# Patient Record
Sex: MALE | Race: WHITE | NOT HISPANIC OR LATINO | Employment: FULL TIME | URBAN - METROPOLITAN AREA
[De-identification: names, ages, dates, MRNs, and addresses within clinical notes are randomized per-mention and may not be internally consistent; named-entity substitution may affect disease eponyms.]

---

## 2017-03-10 ENCOUNTER — OFFICE VISIT (OUTPATIENT)
Dept: FAMILY MEDICINE CLINIC | Facility: CLINIC | Age: 57
End: 2017-03-10

## 2017-03-10 VITALS
SYSTOLIC BLOOD PRESSURE: 120 MMHG | RESPIRATION RATE: 16 BRPM | HEART RATE: 74 BPM | BODY MASS INDEX: 24.37 KG/M2 | HEIGHT: 72 IN | OXYGEN SATURATION: 99 % | TEMPERATURE: 98.6 F | DIASTOLIC BLOOD PRESSURE: 76 MMHG | WEIGHT: 179.9 LBS

## 2017-03-10 DIAGNOSIS — J30.1 SEASONAL ALLERGIC RHINITIS DUE TO POLLEN: Primary | ICD-10-CM

## 2017-03-10 DIAGNOSIS — H69.83 EUSTACHIAN TUBE DYSFUNCTION, BILATERAL: ICD-10-CM

## 2017-03-10 PROBLEM — H69.80 EUSTACHIAN TUBE DYSFUNCTION: Status: ACTIVE | Noted: 2017-03-10

## 2017-03-10 PROBLEM — H69.90 EUSTACHIAN TUBE DYSFUNCTION: Status: ACTIVE | Noted: 2017-03-10

## 2017-03-10 PROCEDURE — 99213 OFFICE O/P EST LOW 20 MIN: CPT | Performed by: INTERNAL MEDICINE

## 2017-03-10 RX ORDER — METHYLPREDNISOLONE 4 MG/1
TABLET ORAL
Qty: 21 TABLET | Refills: 0 | Status: SHIPPED | OUTPATIENT
Start: 2017-03-10 | End: 2018-10-31

## 2017-03-10 NOTE — PROGRESS NOTES
"Subjective   Patient ID: Arnaud Franklin is a 56 y.o. male presents with   Chief Complaint   Patient presents with   • Earache     more in the right ear- bird patient       HPI - this patient had a cold about 3 weeks ago he got over it but now his ears are getting stopped up and he's given some nasal congestion.  Now his ears are popping and stopped up he has no fever or purulent drainage.  He's tried some Flonase and Allegra but it hasn't worked.    Assessment plan    Seasonal allergic rhinitis and eustachian tube dysfunction Medrol Dosepak.    Allergies   Allergen Reactions   • Penicillins        The following portions of the patient's history were reviewed and updated as appropriate: allergies, current medications, past family history, past medical history, past social history, past surgical history and problem list.      Review of Systems   Constitutional: Negative for fatigue and fever.   HENT: Positive for congestion.         Ear popping, \"feels like he's in a barrel\"   Eyes: Negative.    Respiratory: Negative.        Objective     Vitals:    03/10/17 1442   BP: 120/76   Pulse: 74   Resp: 16   Temp: 98.6 °F (37 °C)   TempSrc: Oral   SpO2: 99%   Weight: 179 lb 14.4 oz (81.6 kg)   Height: 72\" (182.9 cm)         Physical Exam   Constitutional: He is oriented to person, place, and time. He appears well-developed and well-nourished.   HENT:   Head: Normocephalic and atraumatic.   Mouth/Throat: Oropharynx is clear and moist.   TMs suggestive of eustachian tube dysfunction.   Eyes: EOM are normal. Pupils are equal, round, and reactive to light.   Pulmonary/Chest: Effort normal and breath sounds normal.   Neurological: He is alert and oriented to person, place, and time.   Nursing note and vitals reviewed.        Arnaud was seen today for earache.    Diagnoses and all orders for this visit:    Seasonal allergic rhinitis due to pollen    Eustachian tube dysfunction, bilateral    Other orders  -     MethylPREDNISolone " (MEDPAMELLA JAMESON,) 4 MG tablet; Take as directed on package instructions.        Call or return to clinic prn if these symptoms worsen or fail to improve as anticipated.

## 2017-07-12 RX ORDER — PANTOPRAZOLE SODIUM 40 MG/1
TABLET, DELAYED RELEASE ORAL
Qty: 30 TABLET | Refills: 0 | Status: SHIPPED | OUTPATIENT
Start: 2017-07-12 | End: 2017-08-25 | Stop reason: SDUPTHER

## 2017-08-25 RX ORDER — PANTOPRAZOLE SODIUM 40 MG/1
40 TABLET, DELAYED RELEASE ORAL DAILY
Qty: 90 TABLET | Refills: 1 | Status: SHIPPED | OUTPATIENT
Start: 2017-08-25 | End: 2018-03-05 | Stop reason: SDUPTHER

## 2017-11-21 RX ORDER — AZELASTINE HCL 205.5 UG/1
SPRAY NASAL
Qty: 30 ML | Refills: 5 | Status: SHIPPED | OUTPATIENT
Start: 2017-11-21 | End: 2022-01-01

## 2018-03-05 RX ORDER — PANTOPRAZOLE SODIUM 40 MG/1
TABLET, DELAYED RELEASE ORAL
Qty: 90 TABLET | Refills: 0 | Status: SHIPPED | OUTPATIENT
Start: 2018-03-05 | End: 2018-06-11 | Stop reason: SDUPTHER

## 2018-06-11 RX ORDER — PANTOPRAZOLE SODIUM 40 MG/1
40 TABLET, DELAYED RELEASE ORAL DAILY
Qty: 90 TABLET | Refills: 0 | Status: SHIPPED | OUTPATIENT
Start: 2018-06-11 | End: 2018-09-27 | Stop reason: SDUPTHER

## 2018-09-27 RX ORDER — PANTOPRAZOLE SODIUM 40 MG/1
TABLET, DELAYED RELEASE ORAL
Qty: 90 TABLET | Refills: 0 | Status: SHIPPED | OUTPATIENT
Start: 2018-09-27 | End: 2018-12-07 | Stop reason: SDUPTHER

## 2018-10-23 DIAGNOSIS — Z00.00 HEALTH CARE MAINTENANCE: Primary | ICD-10-CM

## 2018-10-26 LAB
ALBUMIN SERPL-MCNC: 4.6 G/DL (ref 3.5–5.2)
ALBUMIN/GLOB SERPL: 1.8 G/DL
ALP SERPL-CCNC: 54 U/L (ref 39–117)
ALT SERPL-CCNC: 18 U/L (ref 1–41)
APPEARANCE UR: CLEAR
AST SERPL-CCNC: 20 U/L (ref 1–40)
BASOPHILS # BLD AUTO: 0.03 10*3/MM3 (ref 0–0.2)
BASOPHILS NFR BLD AUTO: 0.5 % (ref 0–1.5)
BILIRUB SERPL-MCNC: 0.6 MG/DL (ref 0.1–1.2)
BILIRUB UR QL STRIP: NEGATIVE
BUN SERPL-MCNC: 16 MG/DL (ref 6–20)
BUN/CREAT SERPL: 16 (ref 7–25)
CALCIUM SERPL-MCNC: 9.4 MG/DL (ref 8.6–10.5)
CHLORIDE SERPL-SCNC: 101 MMOL/L (ref 98–107)
CHOLEST SERPL-MCNC: 233 MG/DL (ref 0–200)
CO2 SERPL-SCNC: 26.6 MMOL/L (ref 22–29)
COLOR UR: YELLOW
CREAT SERPL-MCNC: 1 MG/DL (ref 0.76–1.27)
EOSINOPHIL # BLD AUTO: 0.36 10*3/MM3 (ref 0–0.7)
EOSINOPHIL NFR BLD AUTO: 5.7 % (ref 0.3–6.2)
ERYTHROCYTE [DISTWIDTH] IN BLOOD BY AUTOMATED COUNT: 12.9 % (ref 11.5–14.5)
GLOBULIN SER CALC-MCNC: 2.5 GM/DL
GLUCOSE SERPL-MCNC: 91 MG/DL (ref 65–99)
GLUCOSE UR QL: NEGATIVE
HCT VFR BLD AUTO: 46.2 % (ref 40.4–52.2)
HDLC SERPL-MCNC: 93 MG/DL (ref 40–60)
HGB BLD-MCNC: 15.6 G/DL (ref 13.7–17.6)
HGB UR QL STRIP: NEGATIVE
IMM GRANULOCYTES # BLD: 0.02 10*3/MM3 (ref 0–0.03)
IMM GRANULOCYTES NFR BLD: 0.3 % (ref 0–0.5)
KETONES UR QL STRIP: NEGATIVE
LDLC SERPL CALC-MCNC: 126 MG/DL (ref 0–100)
LEUKOCYTE ESTERASE UR QL STRIP: NEGATIVE
LYMPHOCYTES # BLD AUTO: 1.81 10*3/MM3 (ref 0.9–4.8)
LYMPHOCYTES NFR BLD AUTO: 28.8 % (ref 19.6–45.3)
MCH RBC QN AUTO: 32.1 PG (ref 27–32.7)
MCHC RBC AUTO-ENTMCNC: 33.8 G/DL (ref 32.6–36.4)
MCV RBC AUTO: 95.1 FL (ref 79.8–96.2)
MONOCYTES # BLD AUTO: 0.64 10*3/MM3 (ref 0.2–1.2)
MONOCYTES NFR BLD AUTO: 10.2 % (ref 5–12)
NEUTROPHILS # BLD AUTO: 3.45 10*3/MM3 (ref 1.9–8.1)
NEUTROPHILS NFR BLD AUTO: 54.8 % (ref 42.7–76)
NITRITE UR QL STRIP: NEGATIVE
PH UR STRIP: 6 [PH] (ref 5–8)
PLATELET # BLD AUTO: 224 10*3/MM3 (ref 140–500)
POTASSIUM SERPL-SCNC: 4.4 MMOL/L (ref 3.5–5.2)
PROT SERPL-MCNC: 7.1 G/DL (ref 6–8.5)
PROT UR QL STRIP: NEGATIVE
RBC # BLD AUTO: 4.86 10*6/MM3 (ref 4.6–6)
SODIUM SERPL-SCNC: 138 MMOL/L (ref 136–145)
SP GR UR: 1.02 (ref 1–1.03)
TRIGL SERPL-MCNC: 68 MG/DL (ref 0–150)
UROBILINOGEN UR STRIP-MCNC: NORMAL MG/DL
VLDLC SERPL CALC-MCNC: 13.6 MG/DL (ref 5–40)
WBC # BLD AUTO: 6.29 10*3/MM3 (ref 4.5–10.7)

## 2018-10-31 ENCOUNTER — OFFICE VISIT (OUTPATIENT)
Dept: FAMILY MEDICINE CLINIC | Facility: CLINIC | Age: 58
End: 2018-10-31

## 2018-10-31 VITALS
BODY MASS INDEX: 23.77 KG/M2 | DIASTOLIC BLOOD PRESSURE: 92 MMHG | SYSTOLIC BLOOD PRESSURE: 137 MMHG | HEIGHT: 72 IN | TEMPERATURE: 98.3 F | WEIGHT: 175.5 LBS | HEART RATE: 82 BPM | OXYGEN SATURATION: 97 %

## 2018-10-31 DIAGNOSIS — Z12.5 SCREENING PSA (PROSTATE SPECIFIC ANTIGEN): ICD-10-CM

## 2018-10-31 DIAGNOSIS — Z00.00 HEALTH CARE MAINTENANCE: Primary | ICD-10-CM

## 2018-10-31 PROCEDURE — 99396 PREV VISIT EST AGE 40-64: CPT | Performed by: FAMILY MEDICINE

## 2018-10-31 NOTE — PROGRESS NOTES
"Geoff Franklin is a 58 y.o. male.     Chief Complaint   Patient presents with   • Annual Exam     follow up labs        History of Present Illness      Annual wellness visit.    Exercises regularly but not as much is used to.  One or 2 drinks a few days a week.  Negative Cage.  No first-degree relatives with alcoholism, colon cancer, or prostate cancer.    He complains of some swelling in the common's right hand.  Not painful.  No change in range of motion or other issues.    Ongoing allergy issues.  Takes Flonase Allegra.    Social History   Substance Use Topics   • Smoking status: Former Smoker   • Smokeless tobacco: Never Used   • Alcohol use Yes      Comment: 14 a week      Immunization History   Administered Date(s) Administered   • Tdap 03/02/2016     Family History   Problem Relation Age of Onset   • Breast cancer Mother    • Deep vein thrombosis Father    • Diabetes Father        The following portions of the patient's history were reviewed and updated as appropriate: allergies, current medications, past family history, past medical history, past social history, past surgical history and problem list.          Review of Systems   Constitutional: Negative.    HENT: Negative.    Respiratory: Negative.    Cardiovascular: Negative.    Gastrointestinal: Negative.  Negative for blood in stool.   Endocrine: Negative.    Genitourinary: Negative.  Negative for hematuria.   Musculoskeletal: Negative.    Skin: Negative.    Neurological: Negative.  Negative for headaches.   Psychiatric/Behavioral: Negative.    All other systems reviewed and are negative.      Objective   Blood pressure 137/92, pulse 82, temperature 98.3 °F (36.8 °C), temperature source Oral, height 182.9 cm (72.01\"), weight 79.6 kg (175 lb 8 oz), SpO2 97 %.  Physical Exam   Constitutional: He is oriented to person, place, and time. No distress.   HENT:   Head: Normocephalic and atraumatic.   Right Ear: External ear normal.   Left Ear: " External ear normal.   Mouth/Throat: Oropharynx is clear and moist.   Eyes: Pupils are equal, round, and reactive to light. EOM are normal.   Neck: Normal range of motion. Neck supple.   Cardiovascular: Normal rate and regular rhythm.    Pulmonary/Chest: Effort normal and breath sounds normal.   Abdominal: Soft. Bowel sounds are normal. He exhibits no distension and no mass. There is no tenderness. There is no guarding. No hernia.   Genitourinary: Prostate normal. Prostate is not enlarged and not tender.   Musculoskeletal: Normal range of motion. He exhibits deformity. He exhibits no edema or tenderness.   There are some prominent synovial thickenings over the palm are fourth metacarpal area.  No contracture.  Good range of motion.   Neurological: He is alert and oriented to person, place, and time. He exhibits normal muscle tone. Coordination normal.   Skin: Skin is warm and dry.   Psychiatric: He has a normal mood and affect. His behavior is normal.   Nursing note and vitals reviewed.      Assessment/Plan   Arnaud was seen today for annual exam.    Diagnoses and all orders for this visit:    Health care maintenance    Screening PSA (prostate specific antigen)  -     PSA Screen      Annual wellness visit.    Immunizations.  TdaP up-to-date.  He states he hepatitis A in the distant past.  I recommend the new shingles vaccine at a retail pharmacy.    At risk for D contracture of the right hand.  No current symptoms.  We will observe.  If he develops any stiffness or decreased range of motion or pain in that area, he will let us know and I will refer to hand surgery.  Next    Prostate cancer screening.  Benefits risks and limitations of PSA testing discussed.  Ordered today.    Colon cancer screening up-to-date.  Repeat 3 years.    Recommend regular exercise.    Discussed his alcohol use.  No red flags.    Allergic rhinitis.  Recommend he use the Flonase more than he is.

## 2018-11-01 LAB — PSA SERPL-MCNC: 1.96 NG/ML (ref 0–4)

## 2018-12-07 RX ORDER — PANTOPRAZOLE SODIUM 40 MG/1
TABLET, DELAYED RELEASE ORAL
Qty: 90 TABLET | Refills: 1 | Status: SHIPPED | OUTPATIENT
Start: 2018-12-07 | End: 2019-07-01 | Stop reason: SDUPTHER

## 2019-06-04 ENCOUNTER — OFFICE VISIT (OUTPATIENT)
Dept: FAMILY MEDICINE CLINIC | Facility: CLINIC | Age: 59
End: 2019-06-04

## 2019-06-04 VITALS
TEMPERATURE: 97.9 F | HEART RATE: 74 BPM | HEIGHT: 72 IN | DIASTOLIC BLOOD PRESSURE: 87 MMHG | WEIGHT: 176.5 LBS | OXYGEN SATURATION: 98 % | SYSTOLIC BLOOD PRESSURE: 142 MMHG | BODY MASS INDEX: 23.91 KG/M2

## 2019-06-04 DIAGNOSIS — S00.33XA CONTUSION OF NOSE, INITIAL ENCOUNTER: Primary | ICD-10-CM

## 2019-06-04 PROCEDURE — 99213 OFFICE O/P EST LOW 20 MIN: CPT | Performed by: FAMILY MEDICINE

## 2019-06-04 NOTE — PROGRESS NOTES
"Subjective   Arnaud Franklin is a 58 y.o. male.     Chief Complaint   Patient presents with   • Facial Injury     last friday        History of Present Illness    Friday last week.  Accidentally walked into some 2 x 6 his hanging out of the back of his truck.  He himself at the bridge of the nose.  He had some localized bleeding but no nosebleed.  No loss of consciousness.  No changes in vision.  He has had some tenderness especially on the left side of the nasal bridge.  Some radicular symptoms into the tip of the nose.  Otherwise doing well.  No trouble with breathing.  No nasal passage irritation.      The following portions of the patient's history were reviewed and updated as appropriate: allergies, current medications, past family history, past medical history, past social history, past surgical history and problem list.          Review of Systems   Constitutional: Negative.    HENT: Negative for congestion, nosebleeds and postnasal drip.    Neurological: Negative for headaches.       Objective   Blood pressure 142/87, pulse 74, temperature 97.9 °F (36.6 °C), temperature source Oral, height 182.9 cm (72.01\"), weight 80.1 kg (176 lb 8 oz), SpO2 98 %.  Physical Exam   Constitutional: He appears well-nourished.   HENT:   There is a small abrasion and some ecchymosis over the nasal bridge.  Bony prominence only.  There is some pain to palpation especially on the left side.  Mild to moderate pain.  No crepitation.  The nares are unremarkable.  There is no intraseptal hematoma.  No bleeding.  Pharynx no postnasal discharge or bleeding.  No raccoon eyes.       Assessment/Plan   Arnaud was seen today for facial injury.    Diagnoses and all orders for this visit:    Contusion of nose, initial encounter      Nasal contusion with probable very minor fracture of the nasal bridge.  No specific treatment needed.  There is no evidence of cosmetic or functional changes.  He will call with any type of trouble with nasal breathing " or other concerns.  His blood pressure is elevated today.  It was elevated last visit also.  Recommend he check his blood pressure at home and send us readings.  Otherwise see me as scheduled..

## 2019-07-01 ENCOUNTER — OFFICE VISIT (OUTPATIENT)
Dept: FAMILY MEDICINE CLINIC | Facility: CLINIC | Age: 59
End: 2019-07-01

## 2019-07-01 VITALS
HEART RATE: 71 BPM | WEIGHT: 173.3 LBS | SYSTOLIC BLOOD PRESSURE: 142 MMHG | TEMPERATURE: 97.6 F | BODY MASS INDEX: 23.47 KG/M2 | HEIGHT: 72 IN | DIASTOLIC BLOOD PRESSURE: 83 MMHG | OXYGEN SATURATION: 100 %

## 2019-07-01 DIAGNOSIS — S80.11XA CONTUSION OF KNEE AND LOWER LEG, RIGHT, INITIAL ENCOUNTER: Primary | ICD-10-CM

## 2019-07-01 DIAGNOSIS — S80.01XA CONTUSION OF KNEE AND LOWER LEG, RIGHT, INITIAL ENCOUNTER: Primary | ICD-10-CM

## 2019-07-01 PROCEDURE — 99213 OFFICE O/P EST LOW 20 MIN: CPT | Performed by: FAMILY MEDICINE

## 2019-07-01 RX ORDER — PANTOPRAZOLE SODIUM 40 MG/1
40 TABLET, DELAYED RELEASE ORAL DAILY
Qty: 90 TABLET | Refills: 1 | Status: SHIPPED | OUTPATIENT
Start: 2019-07-01 | End: 2019-12-18

## 2019-07-01 NOTE — PROGRESS NOTES
"Geoff Franklin is a 58 y.o. male.     Chief Complaint   Patient presents with   • Fall     on fri afternoon   • Knee Pain     right knee did hit a rock when he fell.   • Ankle Pain     right   • Leg Pain     right         History of Present Illness    3 [place]  He slipped on some loose gravel.  Inverted his right ankle.  Came down the right knee.  Had some bleeding.  Some swelling.  Since then he has had some mild to moderate pain only.  Worse with ambulation.  No swelling of the ankle.  No calf swelling.  No new numbness or neurological changes.  No discoloration other than some localized bruising.  He is concerned about a possible blood clot, his father has had them.  No other injury.  No alcohol involved.      The following portions of the patient's history were reviewed and updated as appropriate: allergies, current medications, past family history, past medical history, past social history, past surgical history and problem list.          Review of Systems   Constitutional: Negative.    Musculoskeletal: Negative for joint swelling.   Skin: Positive for wound.   Neurological: Negative.        Objective   Blood pressure 142/83, pulse 71, temperature 97.6 °F (36.4 °C), temperature source Oral, height 182.9 cm (72.01\"), weight 78.6 kg (173 lb 4.8 oz), SpO2 100 %.  Physical Exam   Constitutional: No distress.   HENT:   Head: Atraumatic.   Cardiovascular: Normal rate.   Pulmonary/Chest: Effort normal.   Musculoskeletal:   Right knee, right ankle, full range of motion.  He has a contusion at the right anterior lateral pretibial surface.  There is some localized ecchymosis.  It is only minimally tender to palpation.  There is an abrasion is healing.  There is no fibular or tibial pain to palpation.  The ankle reveals no ligamentous pain.  No posterior malleoli or pain.  Good pulses.  No skin discoloration of the ecchymosis locally.  No evidence of compartment syndrome. "   Skin: He is not diaphoretic.       Assessment/Plan   Arnaud was seen today for fall, knee pain, ankle pain and leg pain.    Diagnoses and all orders for this visit:    Contusion of knee and lower leg, right, initial encounter    Other orders  -     pantoprazole (PROTONIX) 40 MG EC tablet; Take 1 tablet by mouth Daily.        Contusion right lower extremity.  No evidence of DVT.  Likely no fracture.  This time I recommend heat, he is already used ice for 3 days.  Ambulation.  Use compression stocking, especially for long car trips.  He will call with increased redness, persistent pain, or other concerns.  He will also call with any ankle or leg swelling.  Patient is aware of these recommendations.

## 2019-10-14 ENCOUNTER — OFFICE VISIT (OUTPATIENT)
Dept: FAMILY MEDICINE CLINIC | Facility: CLINIC | Age: 59
End: 2019-10-14

## 2019-10-14 VITALS
SYSTOLIC BLOOD PRESSURE: 136 MMHG | BODY MASS INDEX: 23.93 KG/M2 | DIASTOLIC BLOOD PRESSURE: 75 MMHG | HEART RATE: 74 BPM | WEIGHT: 176.7 LBS | HEIGHT: 72 IN | TEMPERATURE: 97.6 F | OXYGEN SATURATION: 99 %

## 2019-10-14 DIAGNOSIS — M54.2 NECK PAIN: ICD-10-CM

## 2019-10-14 DIAGNOSIS — J40 BRONCHITIS: Primary | ICD-10-CM

## 2019-10-14 PROCEDURE — 99213 OFFICE O/P EST LOW 20 MIN: CPT | Performed by: FAMILY MEDICINE

## 2019-10-14 NOTE — PROGRESS NOTES
"Subjective   Arnaud Franklin is a 59 y.o. male.     Chief Complaint   Patient presents with   • URI     x 5 days    • Cough   • Laryngitis        History of Present Illness    URI symptoms 5 days.  Cough worse at night.  Mostly thinks from postnasal discharge.  Voice had a little bit.  Some scratchy throat.  No shaking chills.  No high fever.  Otherwise feels fine.  Cough is bothersome last night and also bothered his wife.  Mild to moderate symptoms.  Persistent.      The following portions of the patient's history were reviewed and updated as appropriate: allergies, current medications, past family history, past medical history, past social history, past surgical history and problem list.          Review of Systems   Constitutional: Negative.    HENT: Positive for congestion.    Respiratory: Positive for cough.    Cardiovascular: Negative.    Musculoskeletal: Negative.        Objective   Blood pressure 136/75, pulse 74, temperature 97.6 °F (36.4 °C), temperature source Oral, height 182.9 cm (72.01\"), weight 80.2 kg (176 lb 11.2 oz), SpO2 99 %.  Physical Exam   Constitutional: No distress.   No acute distress.  Nontoxic.   HENT:   Right Ear: Tympanic membrane, external ear and ear canal normal.   Left Ear: Tympanic membrane, external ear and ear canal normal.   Nose: Nose normal.   Mouth/Throat: Oropharynx is clear and moist. No oropharyngeal exudate.   Eyes: Conjunctivae are normal. Right eye exhibits no discharge. Left eye exhibits no discharge. No scleral icterus.   Cardiovascular: Normal rate.   Pulmonary/Chest: Effort normal and breath sounds normal. No stridor. No respiratory distress. He has no wheezes. He has no rales.   No tachypnea   Lymphadenopathy:     He has no cervical adenopathy.   Skin: No rash noted.   Nursing note and vitals reviewed.      Assessment/Plan   Arnaud was seen today for uri, cough and laryngitis.    Diagnoses and all orders for this visit:    Bronchitis    Neck pain  -     Ambulatory " Referral to Physical Therapy Evaluate and treat    Other orders  -     Discontinue: HYDROcod Polst-CPM Polst ER (TUSSIONEX PENNKINETIC ER) 10-8 MG/5ML ER suspension; Take 5 mL by mouth Every 12 (Twelve) Hours As Needed for Cough.  -     HYDROcod Polst-CPM Polst ER (TUSSIONEX PENNKINETIC ER) 10-8 MG/5ML ER suspension; Take 5 mL by mouth Every 12 (Twelve) Hours As Needed for Cough.      Viral URI with developing viral bronchitis.  At this point supportive care.  No clinical evidence of bacterial pneumonia or sinusitis.  Tussionex cough syrup.  As directed.  Do not take while driving or operating heavy machinery.  May be habit-forming.  Will cause sedation.  He will call with persistent cough, high fevers, or other severe symptoms.    He does complain of some neck pain on and off for a number of months if not years.  He has had no change in the occasional tingling in his hands.  Is a good range of motion of his neck today without pain.  Previously went to a chiropractor and is looking for another provider.  Referring to physical therapy for manipulative evaluation and treatment.

## 2019-10-29 ENCOUNTER — TREATMENT (OUTPATIENT)
Dept: PHYSICAL THERAPY | Facility: CLINIC | Age: 59
End: 2019-10-29

## 2019-10-29 DIAGNOSIS — Z12.5 SCREENING FOR PROSTATE CANCER: ICD-10-CM

## 2019-10-29 DIAGNOSIS — Z00.00 HEALTH CARE MAINTENANCE: Primary | ICD-10-CM

## 2019-10-29 DIAGNOSIS — M54.2 PAIN, NECK: Primary | ICD-10-CM

## 2019-10-29 PROCEDURE — 97161 PT EVAL LOW COMPLEX 20 MIN: CPT | Performed by: PHYSICAL THERAPIST

## 2019-10-29 PROCEDURE — 97110 THERAPEUTIC EXERCISES: CPT | Performed by: PHYSICAL THERAPIST

## 2019-10-29 NOTE — PROGRESS NOTES
"  Physical Therapy Initial Evaluation and Plan of Care    Patient: Arnaud Franklin   : 1960  Diagnosis/ICD-10 Code:  Pain, neck [M54.2]  Referring practitioner: Shahriar Coon MD  Date of Initial Visit: 10/29/2019  Today's Date: 10/31/2019  Patient seen for 1 sessions           Subjective Questionnaire: NDI:13      Subjective Evaluation    History of Present Illness  Mechanism of injury: Pt reports he's been going to chiropractor on/off for 20 years. Chiropractor retired and he has yet to find someone that gives him the same relief. Since January \"neck has deteriorated\". Pt reports [facility] taken at [facility] showed DDD. No new imaging. Describes tingling in L digits 3-5, L elbow pain, L scapular winging. States cervical rotation is limited. Difficulty turning head to drive boat. Pt states neck has been better for the past month, but neck is not good.  Has not been exercising lately due to pain.   Typically lifts light weight.  No medication for pain.      Patient Occupation: [employer], sedentary Pain  Current pain ratin  At worst pain ratin  Location: L superior shoulder, elbow, hand  Quality: tight, discomfort and radiating  Aggravating factors: movement and repetitive movement  Progression: improved    Hand dominance: right    Diagnostic Tests  No diagnostic tests performed    Treatments  Previous treatment: chiropractic  Current treatment: physical therapy  Patient Goals  Patient goals for therapy: decreased pain, increased motion and independence with ADLs/IADLs             Objective       Palpation   Left   No palpable tenderness to the cervical paraspinals, levator scapulae and suboccipitals.   Tenderness of the scalenes and upper trapezius.     Tenderness     Additional Tenderness Details  SPs non-tender    Neurological Testing     Additional Neurological Details  Describes tingling in L digits 3-5    Active Range of Motion   Cervical/Thoracic Spine   Cervical    Flexion: 58 degrees   Extension: " 42 (L upper trapezius) degrees with pain  Left lateral flexion: 40 (L UE) degrees with pain  Right lateral flexion: 35 (L UT tightness) degrees   Left rotation: 64 (L upper trapezius) degrees with pain  Right rotation: 82 degrees     Additional Active Range of Motion Details  Mild scapular dyskinesis    Strength/Myotome Testing     Left Shoulder     Planes of Motion   Flexion: 4+   Abduction: 4+   External rotation at 0°: 4+   Internal rotation at 0°: 4+     Right Shoulder     Planes of Motion   Flexion: 4+   Abduction: 4+   External rotation at 0°: 4+   Internal rotation at 0°: 4+     Left Elbow   Flexion: 5  Extension: 4+    Right Elbow   Flexion: 5  Extension: 4+    Tests   Cervical     Left   Negative Spurling's sign.     Right   Negative Spurling's sign.     Additional Tests Details  + Tinels L cubital tunnel  + Sayda L         Assessment & Plan     Assessment  Impairments: abnormal muscle tone, activity intolerance, impaired physical strength, lacks appropriate home exercise program and pain with function  Assessment details: Pt presents with painful cervical AROM, L sided neck tenderness, positive Sayda and Tinels (L), and mildly limited UE strength (bilaterally). Cervical AROM WNL. Negative Spurling's.   Pt will benefit from skilled PT services in order to address listed impairments and increase tolerance to normal daily activities including ADLs, and recreational activities.    Prognosis: good  Functional Limitations: uncomfortable because of pain and unable to perform repetitive tasks  Goals  Plan Goals: Short Term Goals: 2-4 weeks  Patient will:  1. Be independent with initial HEP  2. Be instructed in posture and body mechanics  3. Report pain </= 3/10 with all daily activities    Long Term Goals: 4-8 weeks  Patient will:  1. Report pain of </= 1/10 with all daily activities  2. Demonstrate cervical AROM WNL, so he can turn head when driving without increased symptoms.  3. Be independent with long-term  HEP  4. Perceived disability </=10% as measured by Neck Disability Index    Plan  Therapy options: will be seen for skilled physical therapy services  Planned modality interventions: cryotherapy, electrical stimulation/Russian stimulation, thermotherapy (hydrocollator packs), traction and ultrasound  Other planned modality interventions: Dry needling  Planned therapy interventions: abdominal trunk stabilization, ADL retraining, balance/weight-bearing training, body mechanics training, flexibility, functional ROM exercises, gait training, home exercise program, joint mobilization, manual therapy, neuromuscular re-education, soft tissue mobilization, spinal/joint mobilization, strengthening, stretching and therapeutic activities  Frequency: 2x week  Treatment plan discussed with: patient        Manual Therapy:    25     mins  99614;  Therapeutic Exercise:    0     mins  19484;     Neuromuscular Ledy:    0    mins  41332;    Therapeutic Activity:     0     mins  68302;     Gait Trainin     mins  43730;     Ultrasound:     0     mins  76464;    Electrical Stimulation:    0     mins  92254 ( );  Dry Needling     0     mins self-pay    Timed Treatment:   25   mins   Total Treatment:     60   mins    PT SIGNATURE: Katie Vale, PT   DATE TREATMENT INITIATED: 10/31/2019    Initial Certification  Certification Period: 2020  I certify that the therapy services are furnished while this patient is under my care.  The services outlined above are required by this patient, and will be reviewed every 90 days.     PHYSICIAN: Shahriar Coon MD      DATE:     Please sign and return via fax to [phone].. Thank you, [facility].

## 2019-10-31 LAB
ALBUMIN SERPL-MCNC: 4.6 G/DL (ref 3.5–5.2)
ALBUMIN/GLOB SERPL: 2.1 G/DL
ALP SERPL-CCNC: 53 U/L (ref 39–117)
ALT SERPL-CCNC: 15 U/L (ref 1–41)
AST SERPL-CCNC: 16 U/L (ref 1–40)
BASOPHILS # BLD AUTO: 0.05 10*3/MM3 (ref 0–0.2)
BASOPHILS NFR BLD AUTO: 0.8 % (ref 0–1.5)
BILIRUB SERPL-MCNC: 0.6 MG/DL (ref 0.2–1.2)
BUN SERPL-MCNC: 19 MG/DL (ref 6–20)
BUN/CREAT SERPL: 19.8 (ref 7–25)
CALCIUM SERPL-MCNC: 9.2 MG/DL (ref 8.6–10.5)
CHLORIDE SERPL-SCNC: 103 MMOL/L (ref 98–107)
CHOLEST SERPL-MCNC: 231 MG/DL (ref 0–200)
CO2 SERPL-SCNC: 27 MMOL/L (ref 22–29)
CREAT SERPL-MCNC: 0.96 MG/DL (ref 0.76–1.27)
EOSINOPHIL # BLD AUTO: 0.44 10*3/MM3 (ref 0–0.4)
EOSINOPHIL NFR BLD AUTO: 6.8 % (ref 0.3–6.2)
ERYTHROCYTE [DISTWIDTH] IN BLOOD BY AUTOMATED COUNT: 12.7 % (ref 12.3–15.4)
GLOBULIN SER CALC-MCNC: 2.2 GM/DL
GLUCOSE SERPL-MCNC: 100 MG/DL (ref 65–99)
HCT VFR BLD AUTO: 44.4 % (ref 37.5–51)
HDLC SERPL-MCNC: 86 MG/DL (ref 40–60)
HGB BLD-MCNC: 15.4 G/DL (ref 13–17.7)
IMM GRANULOCYTES # BLD AUTO: 0.03 10*3/MM3 (ref 0–0.05)
IMM GRANULOCYTES NFR BLD AUTO: 0.5 % (ref 0–0.5)
LDLC SERPL CALC-MCNC: 134 MG/DL (ref 0–100)
LYMPHOCYTES # BLD AUTO: 1.6 10*3/MM3 (ref 0.7–3.1)
LYMPHOCYTES NFR BLD AUTO: 24.8 % (ref 19.6–45.3)
MCH RBC QN AUTO: 32.5 PG (ref 26.6–33)
MCHC RBC AUTO-ENTMCNC: 34.7 G/DL (ref 31.5–35.7)
MCV RBC AUTO: 93.7 FL (ref 79–97)
MONOCYTES # BLD AUTO: 0.69 10*3/MM3 (ref 0.1–0.9)
MONOCYTES NFR BLD AUTO: 10.7 % (ref 5–12)
NEUTROPHILS # BLD AUTO: 3.65 10*3/MM3 (ref 1.7–7)
NEUTROPHILS NFR BLD AUTO: 56.4 % (ref 42.7–76)
NRBC BLD AUTO-RTO: 0 /100 WBC (ref 0–0.2)
PLATELET # BLD AUTO: 224 10*3/MM3 (ref 140–450)
POTASSIUM SERPL-SCNC: 4.6 MMOL/L (ref 3.5–5.2)
PROT SERPL-MCNC: 6.8 G/DL (ref 6–8.5)
PSA SERPL-MCNC: 2.04 NG/ML (ref 0–4)
RBC # BLD AUTO: 4.74 10*6/MM3 (ref 4.14–5.8)
SODIUM SERPL-SCNC: 142 MMOL/L (ref 136–145)
TRIGL SERPL-MCNC: 54 MG/DL (ref 0–150)
VLDLC SERPL CALC-MCNC: 10.8 MG/DL
WBC # BLD AUTO: 6.46 10*3/MM3 (ref 3.4–10.8)

## 2019-11-05 ENCOUNTER — TREATMENT (OUTPATIENT)
Dept: PHYSICAL THERAPY | Facility: CLINIC | Age: 59
End: 2019-11-05

## 2019-11-05 DIAGNOSIS — M54.2 PAIN, NECK: Primary | ICD-10-CM

## 2019-11-05 PROCEDURE — 97110 THERAPEUTIC EXERCISES: CPT | Performed by: PHYSICAL THERAPIST

## 2019-11-05 NOTE — PROGRESS NOTES
Physical Therapy Daily Progress Note      Visit # 2      Subjective   Pt reports 80% compliance with HEP. No pain currently. Would like do exercises as opposed to dry needling.    Objective   See Exercise, Manual, and Modality Logs for complete treatment.       Assessment/Plan  Pt reports no pain. Excellent tolerance to cervical stability additions. Progress per POC.                 Manual Therapy:    0     mins  98762;  Therapeutic Exercise:    30     mins  45018;     Neuromuscular Ledy:    0    mins  62681;    Therapeutic Activity:     0     mins  79443;     Gait Trainin     mins  68797;     Ultrasound:     0     mins  48389;    Work Hardening           0      mins 02414  Iontophoresis               0   mins 34999    Timed Treatment:   30   mins   Total Treatment:     35   mins    Katie Patterson, PT  Physical Therapist

## 2019-11-06 ENCOUNTER — OFFICE VISIT (OUTPATIENT)
Dept: FAMILY MEDICINE CLINIC | Facility: CLINIC | Age: 59
End: 2019-11-06

## 2019-11-06 VITALS
DIASTOLIC BLOOD PRESSURE: 71 MMHG | OXYGEN SATURATION: 98 % | BODY MASS INDEX: 23.53 KG/M2 | SYSTOLIC BLOOD PRESSURE: 120 MMHG | TEMPERATURE: 98.5 F | HEIGHT: 72 IN | HEART RATE: 87 BPM | WEIGHT: 173.7 LBS

## 2019-11-06 DIAGNOSIS — Z00.00 HEALTH CARE MAINTENANCE: Primary | ICD-10-CM

## 2019-11-06 DIAGNOSIS — Z23 NEED FOR IMMUNIZATION AGAINST INFLUENZA: ICD-10-CM

## 2019-11-06 PROCEDURE — 90471 IMMUNIZATION ADMIN: CPT | Performed by: FAMILY MEDICINE

## 2019-11-06 PROCEDURE — 99396 PREV VISIT EST AGE 40-64: CPT | Performed by: FAMILY MEDICINE

## 2019-11-06 PROCEDURE — 90674 CCIIV4 VAC NO PRSV 0.5 ML IM: CPT | Performed by: FAMILY MEDICINE

## 2019-11-06 NOTE — PROGRESS NOTES
Subjective   Arnaud Franklin is a 59 y.o. male.     Chief Complaint   Patient presents with   • Annual Exam     follow up labs   • Ear Problem     right ear is poping        History of Present Illness    Here for annual wellness visit.  Patient has been exercising.  Quit smoking about 15 years ago.  He drinks between 7 and 14 drinks a week.  Usually less than more.  He has had some eustachian tube issues for some time, at least a couple of years.  He is been to an allergist.  He finds that the fluticasone and Allegra taken daily helps the most.  He does not take the Astelin nasal spray much.  His colonoscopy was coming due in about 2 years.  There is no family history of colon cancer.  He continues pantoprazole for GERD symptoms.    Social History     Tobacco Use   • Smoking status: Former Smoker   • Smokeless tobacco: Never Used   Substance Use Topics   • Alcohol use: Yes     Comment: 14 a week    • Drug use: No     Immunization History   Administered Date(s) Administered   • Tdap 03/02/2016     Family History   Problem Relation Age of Onset   • Breast cancer Mother    • Deep vein thrombosis Father    • Diabetes Father          The following portions of the patient's history were reviewed and updated as appropriate: allergies, current medications, past family history, past medical history, past social history, past surgical history and problem list.          Review of Systems   Constitutional: Negative.    HENT: Negative.    Respiratory: Negative.    Cardiovascular: Negative.    Gastrointestinal: Negative.  Negative for blood in stool.        No dysphagia.  No severe acid reflux symptoms.   Endocrine: Negative.    Genitourinary: Negative.  Negative for hematuria.   Musculoskeletal: Negative.    Skin: Negative.    Neurological: Negative.    Psychiatric/Behavioral: Negative.    All other systems reviewed and are negative.      Objective   Blood pressure 120/71, pulse 87, temperature 98.5 °F (36.9 °C), temperature  "source Oral, height 182.9 cm (72.01\"), weight 78.8 kg (173 lb 11.2 oz), SpO2 98 %.  Physical Exam   Constitutional: He is oriented to person, place, and time. He appears well-developed and well-nourished. No distress.   HENT:   Head: Normocephalic and atraumatic.   Right Ear: External ear normal.   Left Ear: External ear normal.   Mouth/Throat: Oropharynx is clear and moist.   Eyes: EOM are normal. Pupils are equal, round, and reactive to light.   Neck: Normal range of motion. Neck supple. No thyromegaly present.   Cardiovascular: Normal rate, regular rhythm, normal heart sounds and intact distal pulses.   Pulmonary/Chest: Effort normal and breath sounds normal.   Abdominal: Soft. Bowel sounds are normal. He exhibits no distension and no mass. There is no tenderness. There is no guarding. No hernia.   Genitourinary: Prostate normal. Prostate is not enlarged and not tender.   Musculoskeletal: Normal range of motion. He exhibits no edema or tenderness.   Neurological: He is alert and oriented to person, place, and time. He exhibits normal muscle tone. Coordination normal.   Skin: Skin is warm and dry.   Psychiatric: He has a normal mood and affect. His behavior is normal. Judgment and thought content normal.   Nursing note and vitals reviewed.      Assessment/Plan   Arnaud was seen today for annual exam and ear problem.    Diagnoses and all orders for this visit:    Health care maintenance        Annual wellness visit.    Immunizations.  Patient wants to get a Shingrix vaccine at a retail pharmacy.  I recommend a influenza vaccination today.  We discussed risks including a very rare risk of Guyon Barré syndrome or \"ascending paralysis\".  However the benefits far outweigh the risks.    Colon cancer screening.  Will need to have a colonoscopy in about 2 years.    Prostate cancer screening up-to-date.  Limitations of PSA testing discussed.    Eustachian tube dysfunction.  I gave the patient name of an ENT doctor he can " follow-up with for second opinion if he wishes.  If he needs a referral for the insurance, he will let us know.    Reviewed lab work.  Cholesterol panel excellent.    Preventative health practices discussed including regular exercise.  We also discussed alcohol use.  I will see him back in 1 year.  Sooner as needed.

## 2019-11-13 ENCOUNTER — TREATMENT (OUTPATIENT)
Dept: PHYSICAL THERAPY | Facility: CLINIC | Age: 59
End: 2019-11-13

## 2019-11-13 DIAGNOSIS — M54.2 PAIN, NECK: Primary | ICD-10-CM

## 2019-11-13 PROCEDURE — 97140 MANUAL THERAPY 1/> REGIONS: CPT | Performed by: PHYSICAL THERAPIST

## 2019-11-13 PROCEDURE — 97110 THERAPEUTIC EXERCISES: CPT | Performed by: PHYSICAL THERAPIST

## 2019-11-13 NOTE — PROGRESS NOTES
Physical Therapy Daily Progress Note      Visit # 3      Subjective   Pt reports tightness in L side of neck [place]. Also thinks he is more tight with cold weather.    Objective   See Exercise, Manual, and Modality Logs for complete treatment.       Assessment/Plan  Pt had increased tone in L upper trapezius that improved with STM. Excellent tolerance to cervical strengthening and postural exercise. Updated HEP. Progress per POC.                 Manual Therapy:    10     mins  70972;  Therapeutic Exercise:    20     mins  38499;     Neuromuscular Ledy:    0    mins  16298;    Therapeutic Activity:     0     mins  19665;     Gait Trainin     mins  01277;     Ultrasound:     0     mins  32216;    Work Hardening           0      mins 35734  Iontophoresis               0   mins 82443    Timed Treatment:   30   mins   Total Treatment:     35   mins    Katie Patterson, PT  Physical Therapist

## 2019-11-21 ENCOUNTER — TREATMENT (OUTPATIENT)
Dept: PHYSICAL THERAPY | Facility: CLINIC | Age: 59
End: 2019-11-21

## 2019-11-21 DIAGNOSIS — M54.2 PAIN, NECK: Primary | ICD-10-CM

## 2019-11-21 PROCEDURE — 97110 THERAPEUTIC EXERCISES: CPT | Performed by: PHYSICAL THERAPIST

## 2019-11-21 NOTE — PROGRESS NOTES
Physical Therapy Daily Progress Note      Visit # 4      Subjective   Pt reports general neck tightness that he relates to a sinus headache. Also reports new low back and R hip pain without injury. He was working in yard on a slope which may have contributed.    Objective   See Exercise, Manual, and Modality Logs for complete treatment.       Assessment/Plan  Excellent tolerance to cervical strengthening and postural exercise w/o c/o pain. Declined massage and heat for neck tension, reporting decreased pain post-exercise. Taught pt basic hip/hamstring stretches to alleviate low back pain and recommended evaluation if pain persists greater than a few weeks.               Manual Therapy:    0     mins  16202;  Therapeutic Exercise:    30     mins  47967;     Neuromuscular Ledy:    0    mins  12967;    Therapeutic Activity:     0     mins  17314;     Gait Trainin     mins  91235;     Ultrasound:     0     mins  16838;    Work Hardening           0      mins 40797  Iontophoresis               0   mins 19127    Timed Treatment:   30   mins   Total Treatment:     40   mins    Katie Patterson, PT  Physical Therapist

## 2019-12-03 ENCOUNTER — TREATMENT (OUTPATIENT)
Dept: PHYSICAL THERAPY | Facility: CLINIC | Age: 59
End: 2019-12-03

## 2019-12-03 DIAGNOSIS — M54.2 PAIN, NECK: Primary | ICD-10-CM

## 2019-12-03 PROCEDURE — 97140 MANUAL THERAPY 1/> REGIONS: CPT | Performed by: PHYSICAL THERAPIST

## 2019-12-03 PROCEDURE — 97110 THERAPEUTIC EXERCISES: CPT | Performed by: PHYSICAL THERAPIST

## 2019-12-03 NOTE — PROGRESS NOTES
Physical Therapy Daily Progress Note      Visit # 5      Subjective   Pt reports L side of neck tightened up this morning. Pt attributes to how he slept. Back is fine.    Objective   See Exercise, Manual, and Modality Logs for complete treatment.       Assessment/Plan  No c/o pain. Mild increased tenderness of L upper trapezius. Excellent tolerance to STM w/ reports of decreased tightness. Cervical AROM WNL. Pt demonstrates good head and shoulder posture in absence of verbal cuing. Plan to follow up as needed.               Manual Therapy:    10     mins  31277;  Therapeutic Exercise:    25     mins  11408;     Neuromuscular Ledy:    0    mins  39324;    Therapeutic Activity:     0     mins  87676;     Gait Trainin     mins  79277;     Ultrasound:     0     mins  17345;    Work Hardening           0      mins 32450  Iontophoresis               0   mins 78518    Timed Treatment:   35   mins   Total Treatment:     40   mins    Katie Patterson, PT  Physical Therapist

## 2019-12-18 RX ORDER — PANTOPRAZOLE SODIUM 40 MG/1
TABLET, DELAYED RELEASE ORAL
Qty: 90 TABLET | Refills: 1 | Status: SHIPPED | OUTPATIENT
Start: 2019-12-18 | End: 2020-06-23

## 2020-06-23 RX ORDER — PANTOPRAZOLE SODIUM 40 MG/1
TABLET, DELAYED RELEASE ORAL
Qty: 90 TABLET | Refills: 1 | Status: SHIPPED | OUTPATIENT
Start: 2020-06-23 | End: 2020-10-05

## 2020-10-05 RX ORDER — PANTOPRAZOLE SODIUM 40 MG/1
TABLET, DELAYED RELEASE ORAL
Qty: 90 TABLET | Refills: 1 | Status: SHIPPED | OUTPATIENT
Start: 2020-10-05 | End: 2021-11-29

## 2021-01-25 DIAGNOSIS — Z00.00 ANNUAL PHYSICAL EXAM: ICD-10-CM

## 2021-01-25 DIAGNOSIS — Z13.220 LIPID SCREENING: ICD-10-CM

## 2021-01-25 DIAGNOSIS — Z00.00 HEALTHCARE MAINTENANCE: Primary | ICD-10-CM

## 2021-01-25 DIAGNOSIS — Z12.5 SCREENING PSA (PROSTATE SPECIFIC ANTIGEN): ICD-10-CM

## 2021-01-26 DIAGNOSIS — Z12.5 SCREENING PSA (PROSTATE SPECIFIC ANTIGEN): ICD-10-CM

## 2021-01-26 DIAGNOSIS — Z00.00 HEALTHCARE MAINTENANCE: ICD-10-CM

## 2021-01-26 DIAGNOSIS — Z13.220 LIPID SCREENING: ICD-10-CM

## 2021-01-26 DIAGNOSIS — Z00.00 ANNUAL PHYSICAL EXAM: ICD-10-CM

## 2021-01-27 LAB
ALBUMIN SERPL-MCNC: 4.6 G/DL (ref 3.5–5.2)
ALBUMIN/GLOB SERPL: 1.8 G/DL
ALP SERPL-CCNC: 56 U/L (ref 39–117)
ALT SERPL-CCNC: 22 U/L (ref 1–41)
AST SERPL-CCNC: 30 U/L (ref 1–40)
BASOPHILS # BLD AUTO: 0.06 10*3/MM3 (ref 0–0.2)
BASOPHILS NFR BLD AUTO: 1.2 % (ref 0–1.5)
BILIRUB SERPL-MCNC: 0.8 MG/DL (ref 0–1.2)
BUN SERPL-MCNC: 14 MG/DL (ref 6–20)
BUN/CREAT SERPL: 15.6 (ref 7–25)
CALCIUM SERPL-MCNC: 9.5 MG/DL (ref 8.6–10.5)
CHLORIDE SERPL-SCNC: 99 MMOL/L (ref 98–107)
CHOLEST SERPL-MCNC: 278 MG/DL (ref 0–200)
CO2 SERPL-SCNC: 25.7 MMOL/L (ref 22–29)
CREAT SERPL-MCNC: 0.9 MG/DL (ref 0.76–1.27)
EOSINOPHIL # BLD AUTO: 0.21 10*3/MM3 (ref 0–0.4)
EOSINOPHIL NFR BLD AUTO: 4.1 % (ref 0.3–6.2)
ERYTHROCYTE [DISTWIDTH] IN BLOOD BY AUTOMATED COUNT: 12.3 % (ref 12.3–15.4)
GLOBULIN SER CALC-MCNC: 2.6 GM/DL
GLUCOSE SERPL-MCNC: 78 MG/DL (ref 65–99)
HCT VFR BLD AUTO: 46.4 % (ref 37.5–51)
HCV AB S/CO SERPL IA: <0.1 S/CO RATIO (ref 0–0.9)
HDLC SERPL-MCNC: 92 MG/DL (ref 40–60)
HGB BLD-MCNC: 16.4 G/DL (ref 13–17.7)
IMM GRANULOCYTES # BLD AUTO: 0.02 10*3/MM3 (ref 0–0.05)
IMM GRANULOCYTES NFR BLD AUTO: 0.4 % (ref 0–0.5)
LDLC SERPL CALC-MCNC: 174 MG/DL (ref 0–100)
LYMPHOCYTES # BLD AUTO: 1.27 10*3/MM3 (ref 0.7–3.1)
LYMPHOCYTES NFR BLD AUTO: 24.8 % (ref 19.6–45.3)
MCH RBC QN AUTO: 32.3 PG (ref 26.6–33)
MCHC RBC AUTO-ENTMCNC: 35.3 G/DL (ref 31.5–35.7)
MCV RBC AUTO: 91.5 FL (ref 79–97)
MONOCYTES # BLD AUTO: 0.56 10*3/MM3 (ref 0.1–0.9)
MONOCYTES NFR BLD AUTO: 10.9 % (ref 5–12)
NEUTROPHILS # BLD AUTO: 3.01 10*3/MM3 (ref 1.7–7)
NEUTROPHILS NFR BLD AUTO: 58.6 % (ref 42.7–76)
NRBC BLD AUTO-RTO: 0 /100 WBC (ref 0–0.2)
PLATELET # BLD AUTO: 221 10*3/MM3 (ref 140–450)
POTASSIUM SERPL-SCNC: 4.7 MMOL/L (ref 3.5–5.2)
PROT SERPL-MCNC: 7.2 G/DL (ref 6–8.5)
PSA SERPL-MCNC: 1.23 NG/ML (ref 0–4)
RBC # BLD AUTO: 5.07 10*6/MM3 (ref 4.14–5.8)
SODIUM SERPL-SCNC: 136 MMOL/L (ref 136–145)
TRIGL SERPL-MCNC: 74 MG/DL (ref 0–150)
VLDLC SERPL CALC-MCNC: 12 MG/DL (ref 5–40)
WBC # BLD AUTO: 5.13 10*3/MM3 (ref 3.4–10.8)

## 2021-02-02 ENCOUNTER — OFFICE VISIT (OUTPATIENT)
Dept: FAMILY MEDICINE CLINIC | Facility: CLINIC | Age: 61
End: 2021-02-02

## 2021-02-02 VITALS
HEIGHT: 72 IN | TEMPERATURE: 97.1 F | OXYGEN SATURATION: 100 % | SYSTOLIC BLOOD PRESSURE: 130 MMHG | BODY MASS INDEX: 23.35 KG/M2 | HEART RATE: 79 BPM | DIASTOLIC BLOOD PRESSURE: 80 MMHG | WEIGHT: 172.4 LBS

## 2021-02-02 DIAGNOSIS — Z00.00 HEALTH CARE MAINTENANCE: Primary | ICD-10-CM

## 2021-02-02 DIAGNOSIS — Z12.11 ENCOUNTER FOR SCREENING COLONOSCOPY: ICD-10-CM

## 2021-02-02 PROCEDURE — 99396 PREV VISIT EST AGE 40-64: CPT | Performed by: FAMILY MEDICINE

## 2021-02-02 NOTE — PROGRESS NOTES
"Chief Complaint  Annual Exam    Subjective          Arnaud Franklin presents to [facility] for   History of Present Illness    Here for annual healthcare maintenance visit.  Has been exercising often.  He is losing weight through diet and exercise.  He is on a lower carbohydrate higher vegetable diet.  He drinks alcohol maybe 1 or 2 drinks a day.  He never has 5 or 6 or more 1 setting.  He has been going to a chiropractor/physical therapist for some right sciatica/IT band discomfort.  That is going well for him and getting better.  He has no functional deficit in his lower extremities.  No  symptoms.  No saddle anesthesia.    He is coming due for colonoscopy.    No dysphagia.  No food getting stuck when swallowing.  His GERD symptoms are improving since his diet.  He is cutting back the pantoprazole to every other day now.    Reviewed his lipid panel.  10-year risk of atherosclerotic coronary vascular disease is 7%.  His LDL jumped in the 172.  His HDL remains high though at 92.  There is no premature family history of coronary artery disease.  He quit smoking about 15 or 20 years ago with less than a pack a day for a few years.    The 10-year ASCVD risk score ([place] YOLETTE Jr., et al., 2013) is: 7.1%    Values used to calculate the score:      Age: 60 years      Sex: Male      Is Non- : No      Diabetic: No      Tobacco smoker: No      Systolic Blood Pressure: 130 mmHg      Is BP treated: No      HDL Cholesterol: 92 mg/dL      Total Cholesterol: 278 mg/dL      Objective   Vital Signs:   /80   Pulse 79   Temp 97.1 °F (36.2 °C) (Temporal)   Ht 182.9 cm (72\")   Wt 78.2 kg (172 lb 6.4 oz)   SpO2 100%   BMI 23.38 kg/m²     Physical Exam  Constitutional:       Appearance: Normal appearance.   HENT:      Head: Atraumatic.   Eyes:      Conjunctiva/sclera: Conjunctivae normal.   Neck:      Musculoskeletal: Normal range of motion and neck supple. No muscular " tenderness.   Cardiovascular:      Rate and Rhythm: Normal rate and regular rhythm.      Pulses: Normal pulses.      Heart sounds: Normal heart sounds.   Pulmonary:      Effort: Pulmonary effort is normal.      Breath sounds: Normal breath sounds.   Abdominal:      General: Abdomen is flat. There is no distension.      Palpations: Abdomen is soft. There is no mass.      Tenderness: There is no abdominal tenderness.      Hernia: No hernia is present.   Genitourinary:     Comments: Prostate +2 size.  No nodules.  Musculoskeletal: Normal range of motion.   Lymphadenopathy:      Cervical: No cervical adenopathy.   Skin:     General: Skin is warm and dry.      Findings: No rash.   Neurological:      General: No focal deficit present.      Mental Status: He is alert and oriented to person, place, and time.   Psychiatric:         Mood and Affect: Mood normal.         Behavior: Behavior normal.        Result Review :   The following data was reviewed by: Shahriar Coon MD on 02/02/2021:  Common labs    Common Labsle 1/26/21 1/26/21 1/26/21 1/26/21    0839 0839 0839 0839   Glucose   78    BUN   14    Creatinine   0.90    eGFR Non  Am   86    eGFR African Am   105    Sodium   136    Potassium   4.7    Chloride   99    Calcium   9.5    Total Protein   7.2    Albumin   4.60    Total Bilirubin   0.8    Alkaline Phosphatase   56    AST (SGOT)   30    ALT (SGPT)   22    WBC    5.13   Hemoglobin    16.4   Hematocrit    46.4   Platelets    221   Total Cholesterol  278 (A)     Triglycerides  74     HDL Cholesterol  92 (A)     LDL Cholesterol   174 (A)     PSA 1.230      (A) Abnormal value       Comments are available for some flowsheets but are not being displayed.                     Assessment and Plan    Problem List Items Addressed This Visit     None      Visit Diagnoses     Health care maintenance    -  Primary    Encounter for screening colonoscopy        Relevant Orders    Ambulatory Referral For Screening  Colonoscopy        Annual health care maintenance visit.    Immunizations.  We discussed COVID-19 vaccination when available.    Hyperlipidemia.  Both high HDL and high LDL.  10-year risk 7%.  No family history of premature coronary disease.  Remote history of smoking.  We will see him back in 6 months.  He will continue to work on his diet.  If the LDL is still high or higher to consider CT CAC score and/or other evaluation.    Preventative health practices discussed including regular exercise.      Prostate cancer screening up-to-date    Colon cancer screening, colonoscopy ordered.          Follow Up   No follow-ups on file.  Patient was given instructions and counseling regarding his condition or for health maintenance advice. Please see specific information pulled into the AVS if appropriate.

## 2021-07-28 ENCOUNTER — PREP FOR SURGERY (OUTPATIENT)
Dept: OTHER | Facility: HOSPITAL | Age: 61
End: 2021-07-28

## 2021-07-28 DIAGNOSIS — Z12.11 SCREEN FOR COLON CANCER: Primary | ICD-10-CM

## 2021-08-02 DIAGNOSIS — E78.5 HYPERLIPIDEMIA, UNSPECIFIED HYPERLIPIDEMIA TYPE: Primary | ICD-10-CM

## 2021-08-06 LAB
ALBUMIN SERPL-MCNC: 4.7 G/DL (ref 3.5–5.2)
ALBUMIN/GLOB SERPL: 2 G/DL
ALP SERPL-CCNC: 59 U/L (ref 39–117)
ALT SERPL-CCNC: 19 U/L (ref 1–41)
AST SERPL-CCNC: 20 U/L (ref 1–40)
BILIRUB SERPL-MCNC: 0.7 MG/DL (ref 0–1.2)
BUN SERPL-MCNC: 15 MG/DL (ref 6–20)
BUN/CREAT SERPL: 15.8 (ref 7–25)
CALCIUM SERPL-MCNC: 9.8 MG/DL (ref 8.6–10.5)
CHLORIDE SERPL-SCNC: 102 MMOL/L (ref 98–107)
CHOLEST SERPL-MCNC: 264 MG/DL (ref 0–200)
CO2 SERPL-SCNC: 26.6 MMOL/L (ref 22–29)
CREAT SERPL-MCNC: 0.95 MG/DL (ref 0.76–1.27)
GLOBULIN SER CALC-MCNC: 2.4 GM/DL
GLUCOSE SERPL-MCNC: 94 MG/DL (ref 65–99)
HDLC SERPL-MCNC: 101 MG/DL (ref 40–60)
LDLC SERPL CALC-MCNC: 154 MG/DL (ref 0–100)
POTASSIUM SERPL-SCNC: 4.5 MMOL/L (ref 3.5–5.2)
PROT SERPL-MCNC: 7.1 G/DL (ref 6–8.5)
SODIUM SERPL-SCNC: 138 MMOL/L (ref 136–145)
TRIGL SERPL-MCNC: 60 MG/DL (ref 0–150)
VLDLC SERPL CALC-MCNC: 9 MG/DL (ref 5–40)

## 2021-08-10 ENCOUNTER — OFFICE VISIT (OUTPATIENT)
Dept: FAMILY MEDICINE CLINIC | Facility: CLINIC | Age: 61
End: 2021-08-10

## 2021-08-10 VITALS
TEMPERATURE: 96.9 F | SYSTOLIC BLOOD PRESSURE: 148 MMHG | OXYGEN SATURATION: 100 % | HEART RATE: 74 BPM | HEIGHT: 72 IN | BODY MASS INDEX: 22.69 KG/M2 | WEIGHT: 167.5 LBS | DIASTOLIC BLOOD PRESSURE: 93 MMHG

## 2021-08-10 DIAGNOSIS — E78.5 HYPERLIPIDEMIA, UNSPECIFIED HYPERLIPIDEMIA TYPE: Primary | ICD-10-CM

## 2021-08-10 PROCEDURE — 99213 OFFICE O/P EST LOW 20 MIN: CPT | Performed by: FAMILY MEDICINE

## 2021-08-10 NOTE — PROGRESS NOTES
"Chief Complaint  Hyperlipidemia (follow up )    Subjective          Arnaud Franklin presents to [facility]  History of Present Illness     Hyperlipidemia follow-up.  Is a good job with his diet and exercise.  His weight is down about 15 pounds or so.  He continues exercise.  His LDL is down.  His HDL is now 101.  This is certainly decreasing his cardiovascular risk.  No cardiovascular complaints.  He has not been checking his blood pressure at home.    Objective   Vital Signs:   /93   Pulse 74   Temp 96.9 °F (36.1 °C) (Temporal)   Ht 182.9 cm (72.01\")   Wt 76 kg (167 lb 8 oz)   SpO2 100%   BMI 22.71 kg/m²     Physical Exam  Vitals and nursing note reviewed.   Constitutional:       General: He is not in acute distress.     Appearance: He is well-developed.   Cardiovascular:      Rate and Rhythm: Normal rate and regular rhythm.      Heart sounds: Normal heart sounds.   Pulmonary:      Effort: Pulmonary effort is normal.      Breath sounds: Normal breath sounds.   Skin:     General: Skin is warm and dry.   Psychiatric:         Behavior: Behavior normal.         Thought Content: Thought content normal.         Judgment: Judgment normal.        Result Review :   The following data was reviewed by: Shahriar Coon MD on 08/10/2021:  Common labs    Common Labsle 1/26/21 1/26/21 1/26/21 1/26/21 8/5/21 8/5/21    0839 0839 0839 0839 0850 0850   Glucose   78  94    BUN   14  15    Creatinine   0.90  0.95    eGFR Non  Am   86  81    eGFR African Am   105  98    Sodium   136  138    Potassium   4.7  4.5    Chloride   99  102    Calcium   9.5  9.8    Total Protein   7.2  7.1    Albumin   4.60  4.70    Total Bilirubin   0.8  0.7    Alkaline Phosphatase   56  59    AST (SGOT)   30  20    ALT (SGPT)   22  19    WBC    5.13     Hemoglobin    16.4     Hematocrit    46.4     Platelets    221     Total Cholesterol  278 (A)    264 (A)   Triglycerides  74    60   HDL Cholesterol  92 (A)    " 101 (A)   LDL Cholesterol   174 (A)    154 (A)   PSA 1.230        (A) Abnormal value       Comments are available for some flowsheets but are not being displayed.                     Assessment and Plan    Diagnoses and all orders for this visit:    1. Hyperlipidemia, unspecified hyperlipidemia type (Primary)      Hyperlipidemia.  Diet improved.  Continue same.  HDL high.  LDL much lower.    Hypertension?  Blood pressure elevated today.  He is going to be checking his blood pressure at home.  I rechecked his blood pressure today it was still elevated.  He is going to send us blood pressure readings in about 3 or 4 weeks.  Otherwise I will see him back in 6 months.  Annual physical then.      Follow Up   No follow-ups on file.  Patient was given instructions and counseling regarding his condition or for health maintenance advice. Please see specific information pulled into the AVS if appropriate.

## 2021-11-05 PROBLEM — Z12.11 SCREEN FOR COLON CANCER: Status: ACTIVE | Noted: 2021-11-05

## 2021-11-29 RX ORDER — PANTOPRAZOLE SODIUM 40 MG/1
TABLET, DELAYED RELEASE ORAL
Qty: 90 TABLET | Refills: 1 | Status: SHIPPED | OUTPATIENT
Start: 2021-11-29 | End: 2022-07-07

## 2022-01-11 RX ORDER — FLUTICASONE PROPIONATE 50 MCG
2 SPRAY, SUSPENSION (ML) NASAL DAILY
COMMUNITY

## 2022-01-12 ENCOUNTER — ANESTHESIA EVENT (OUTPATIENT)
Dept: GASTROENTEROLOGY | Facility: HOSPITAL | Age: 62
End: 2022-01-12

## 2022-01-12 ENCOUNTER — ANESTHESIA (OUTPATIENT)
Dept: GASTROENTEROLOGY | Facility: HOSPITAL | Age: 62
End: 2022-01-12

## 2022-01-12 ENCOUNTER — HOSPITAL ENCOUNTER (OUTPATIENT)
Facility: HOSPITAL | Age: 62
Setting detail: HOSPITAL OUTPATIENT SURGERY
Discharge: HOME | End: 2022-01-12
Attending: SURGERY | Admitting: SURGERY

## 2022-01-12 VITALS
DIASTOLIC BLOOD PRESSURE: 79 MMHG | WEIGHT: 162.2 LBS | HEART RATE: 78 BPM | SYSTOLIC BLOOD PRESSURE: 119 MMHG | BODY MASS INDEX: 22.71 KG/M2 | HEIGHT: 71 IN | OXYGEN SATURATION: 96 % | RESPIRATION RATE: 18 BRPM

## 2022-01-12 DIAGNOSIS — Z12.11 SCREEN FOR COLON CANCER: ICD-10-CM

## 2022-01-12 PROCEDURE — S0260 H&P FOR SURGERY: HCPCS | Performed by: SURGERY

## 2022-01-12 PROCEDURE — 45385 COLONOSCOPY W/LESION REMOVAL: CPT | Performed by: SURGERY

## 2022-01-12 PROCEDURE — 88305 TISSUE EXAM BY PATHOLOGIST: CPT | Performed by: SURGERY

## 2022-01-12 PROCEDURE — 45380 COLONOSCOPY AND BIOPSY: CPT | Performed by: SURGERY

## 2022-01-12 PROCEDURE — 25010000002 PROPOFOL 10 MG/ML EMULSION

## 2022-01-12 RX ORDER — LIDOCAINE HYDROCHLORIDE 20 MG/ML
INJECTION, SOLUTION INFILTRATION; PERINEURAL AS NEEDED
Status: DISCONTINUED | OUTPATIENT
Start: 2022-01-12 | End: 2022-01-12 | Stop reason: SURG

## 2022-01-12 RX ORDER — GLYCOPYRROLATE 0.2 MG/ML
INJECTION INTRAMUSCULAR; INTRAVENOUS AS NEEDED
Status: DISCONTINUED | OUTPATIENT
Start: 2022-01-12 | End: 2022-01-12 | Stop reason: SURG

## 2022-01-12 RX ORDER — PROPOFOL 10 MG/ML
VIAL (ML) INTRAVENOUS CONTINUOUS PRN
Status: DISCONTINUED | OUTPATIENT
Start: 2022-01-12 | End: 2022-01-12 | Stop reason: SURG

## 2022-01-12 RX ORDER — PROMETHAZINE HYDROCHLORIDE 25 MG/1
25 SUPPOSITORY RECTAL ONCE AS NEEDED
Status: DISCONTINUED | OUTPATIENT
Start: 2022-01-12 | End: 2022-01-12 | Stop reason: HOSPADM

## 2022-01-12 RX ORDER — PROMETHAZINE HYDROCHLORIDE 25 MG/1
25 TABLET ORAL ONCE AS NEEDED
Status: DISCONTINUED | OUTPATIENT
Start: 2022-01-12 | End: 2022-01-12 | Stop reason: HOSPADM

## 2022-01-12 RX ORDER — SODIUM CHLORIDE, SODIUM LACTATE, POTASSIUM CHLORIDE, CALCIUM CHLORIDE 600; 310; 30; 20 MG/100ML; MG/100ML; MG/100ML; MG/100ML
30 INJECTION, SOLUTION INTRAVENOUS CONTINUOUS
Status: DISCONTINUED | OUTPATIENT
Start: 2022-01-12 | End: 2022-01-12 | Stop reason: HOSPADM

## 2022-01-12 RX ORDER — PROPOFOL 10 MG/ML
VIAL (ML) INTRAVENOUS AS NEEDED
Status: DISCONTINUED | OUTPATIENT
Start: 2022-01-12 | End: 2022-01-12 | Stop reason: SURG

## 2022-01-12 RX ADMIN — PROPOFOL 100 MG: 10 INJECTION, EMULSION INTRAVENOUS at 08:00

## 2022-01-12 RX ADMIN — LIDOCAINE HYDROCHLORIDE 60 MG: 20 INJECTION, SOLUTION INFILTRATION; PERINEURAL at 08:00

## 2022-01-12 RX ADMIN — GLYCOPYRROLATE 0.2 MG: 0.2 INJECTION INTRAMUSCULAR; INTRAVENOUS at 08:02

## 2022-01-12 RX ADMIN — Medication 200 MCG/KG/MIN: at 08:02

## 2022-01-12 RX ADMIN — SODIUM CHLORIDE, POTASSIUM CHLORIDE, SODIUM LACTATE AND CALCIUM CHLORIDE 30 ML/HR: 600; 310; 30; 20 INJECTION, SOLUTION INTRAVENOUS at 06:55

## 2022-01-12 NOTE — H&P
HPI: Screening, no family history of colon cancer, no personal history of polyps.    PMH, PSH, MEDS AND ALLERGIES reviewed and reconciled with EPIC    PHYSICAL EXAM:  -  Constitutional:  no acute distress  -  Respiratory:  normal inspiratory effort  -  Cardiovascular: regular rate  -  Gastrointestinal: Soft    ASSESSMENT/PLAN:    Colonoscopy    Tony Paniagua M.D.

## 2022-01-12 NOTE — OP NOTE
PREOPERATIVE DIAGNOSIS:  Screening    POSTOPERATIVE DIAGNOSIS AND FINDINGS:  • Tiny cecal polyp  • Small ascending colon polyp  • Minimal sigmoid diverticulosis    PROCEDURE:  Colonoscopy to terminal ileum with:  · Cold biopsy removal of cecal polyp  · Snare polypectomy of ascending colon polyp    SURGEON:  Tony Paniagua MD    ANESTHESIA:  MAC    SPECIMEN(S):  Polyps    DESCRIPTION:  In decubitus position digital rectal exam was normal with normal prostate. Colonoscope inserted under direct visualization of lumen to cecum confirmed by visualization of ileocecal valve and appendiceal orifice. Ileocecal valve was intubated and terminal ileum was grossly normal. Scope was slowly withdrawn circumferentially examining all mucosal surfaces. Bowel preparation was excellent. There is a tiny polyp in the cecum removed completely with cold biopsy forceps, good hemostasis at the site. A small but somewhat larger polyp in ascending colon was removed completely with cold snare and retrieved. Good hemostasis at the site. No other mucosal abnormalities were noted. Minimal sigmoid diverticulosis noted. Tolerated well.    RECOMMENDATION FOR FUTURE SURVEILLANCE:  To be determined based on polyp pathology and issued as separate report    Tony Paniagua M.D.

## 2022-01-12 NOTE — ANESTHESIA PREPROCEDURE EVALUATION
Anesthesia Evaluation     Patient summary reviewed   NPO Solid Status: > 8 hours             Airway   Mallampati: II  Neck ROM: full  No difficulty expected  Dental      Pulmonary    Cardiovascular     Rhythm: regular        Neuro/Psych  GI/Hepatic/Renal/Endo    (+)  GERD,      Musculoskeletal     Abdominal    Substance History      OB/GYN          Other                        Anesthesia Plan    ASA 2     MAC       Anesthetic plan, all risks, benefits, and alternatives have been provided, discussed and informed consent has been obtained with: patient.

## 2022-01-12 NOTE — ANESTHESIA POSTPROCEDURE EVALUATION
Patient: Arnaud Franklin    Procedure Summary     Date: 01/12/22 Room / Location:  [facility]    Anesthesia Start: 0757 Anesthesia Stop: 0817    Procedure: COLONOSCOPY to CECUM  WITH COLD BX/SNARE POLYPECTOMIES (N/A ) Diagnosis:       Screen for colon cancer      (Screen for colon cancer [Z12.11])    Surgeons: Tony Paniagua MD Provider: Yo De La Rosa MD    Anesthesia Type: MAC ASA Status: 2          Anesthesia Type: MAC    Vitals  Vitals Value Taken Time   /79 01/12/22 0837   Temp     Pulse 84 01/12/22 0841   Resp 18 01/12/22 0837   SpO2 90 % 01/12/22 0841   Vitals shown include unvalidated device data.        Post Anesthesia Care and Evaluation    Patient location during evaluation: PACU  Patient participation: complete - patient participated  Level of consciousness: awake  Pain score: 1  Pain management: adequate  Airway patency: patent  Anesthetic complications: No anesthetic complications  PONV Status: none  Cardiovascular status: acceptable  Respiratory status: acceptable  Hydration status: acceptable

## 2022-01-13 ENCOUNTER — TELEPHONE (OUTPATIENT)
Dept: SURGERY | Facility: CLINIC | Age: 62
End: 2022-01-13

## 2022-01-13 ENCOUNTER — DOCUMENTATION (OUTPATIENT)
Dept: SURGERY | Facility: CLINIC | Age: 62
End: 2022-01-13

## 2022-01-13 LAB
LAB AP CASE REPORT: NORMAL
PATH REPORT.FINAL DX SPEC: NORMAL
PATH REPORT.GROSS SPEC: NORMAL

## 2022-01-13 NOTE — TELEPHONE ENCOUNTER
----- Message from Tony Paniagua MD sent at 1/13/2022  1:12 PM EST -----  Please let him know that he had 2 benign polyps and 5-year surveillance recommended-put in computer for reminder

## 2022-01-13 NOTE — PROGRESS NOTES
ENDOSCOPY FOLLOW UP NOTE    Colonoscopy 1/12/2022    Indication:  Screening    Findings (Pathology):  · Tiny cecal polyp (tubular adenoma)  · Small ascending colon polyp (tubular adenoma)  · Minimal sigmoid diverticulosis    Recommendations:  5-year surveillance    Tony Paniagua M.D.

## 2022-01-13 NOTE — PROGRESS NOTES
Please let him know that he had 2 benign polyps and 5-year surveillance recommended-put in computer for reminder

## 2022-02-07 DIAGNOSIS — Z00.00 ANNUAL PHYSICAL EXAM: Primary | ICD-10-CM

## 2022-02-07 DIAGNOSIS — Z12.5 SCREENING PSA (PROSTATE SPECIFIC ANTIGEN): ICD-10-CM

## 2022-02-08 DIAGNOSIS — Z00.00 ANNUAL PHYSICAL EXAM: ICD-10-CM

## 2022-02-08 DIAGNOSIS — Z12.5 SCREENING PSA (PROSTATE SPECIFIC ANTIGEN): ICD-10-CM

## 2022-02-09 LAB
ALBUMIN SERPL-MCNC: 4.3 G/DL (ref 3.8–4.9)
ALBUMIN/GLOB SERPL: 1.6 {RATIO} (ref 1.2–2.2)
ALP SERPL-CCNC: 56 IU/L (ref 44–121)
ALT SERPL-CCNC: 12 IU/L (ref 0–44)
AST SERPL-CCNC: 19 IU/L (ref 0–40)
BASOPHILS # BLD AUTO: 0.1 X10E3/UL (ref 0–0.2)
BASOPHILS NFR BLD AUTO: 1 %
BILIRUB SERPL-MCNC: 0.8 MG/DL (ref 0–1.2)
BUN SERPL-MCNC: 15 MG/DL (ref 8–27)
BUN/CREAT SERPL: 14 (ref 10–24)
CALCIUM SERPL-MCNC: 9.6 MG/DL (ref 8.6–10.2)
CHLORIDE SERPL-SCNC: 103 MMOL/L (ref 96–106)
CHOLEST SERPL-MCNC: 250 MG/DL (ref 100–199)
CO2 SERPL-SCNC: 24 MMOL/L (ref 20–29)
CREAT SERPL-MCNC: 1.07 MG/DL (ref 0.76–1.27)
EOSINOPHIL # BLD AUTO: 0.3 X10E3/UL (ref 0–0.4)
EOSINOPHIL NFR BLD AUTO: 6 %
ERYTHROCYTE [DISTWIDTH] IN BLOOD BY AUTOMATED COUNT: 12 % (ref 11.6–15.4)
GLOBULIN SER CALC-MCNC: 2.7 G/DL (ref 1.5–4.5)
GLUCOSE SERPL-MCNC: 96 MG/DL (ref 65–99)
HCT VFR BLD AUTO: 47.4 % (ref 37.5–51)
HDLC SERPL-MCNC: 94 MG/DL
HGB BLD-MCNC: 16.4 G/DL (ref 13–17.7)
IMM GRANULOCYTES # BLD AUTO: 0 X10E3/UL (ref 0–0.1)
IMM GRANULOCYTES NFR BLD AUTO: 0 %
LDLC SERPL CALC-MCNC: 149 MG/DL (ref 0–99)
LYMPHOCYTES # BLD AUTO: 1.3 X10E3/UL (ref 0.7–3.1)
LYMPHOCYTES NFR BLD AUTO: 24 %
MCH RBC QN AUTO: 32 PG (ref 26.6–33)
MCHC RBC AUTO-ENTMCNC: 34.6 G/DL (ref 31.5–35.7)
MCV RBC AUTO: 93 FL (ref 79–97)
MONOCYTES # BLD AUTO: 0.7 X10E3/UL (ref 0.1–0.9)
MONOCYTES NFR BLD AUTO: 12 %
NEUTROPHILS # BLD AUTO: 3.1 X10E3/UL (ref 1.4–7)
NEUTROPHILS NFR BLD AUTO: 57 %
PLATELET # BLD AUTO: 216 X10E3/UL (ref 150–450)
POTASSIUM SERPL-SCNC: 4.6 MMOL/L (ref 3.5–5.2)
PROT SERPL-MCNC: 7 G/DL (ref 6–8.5)
PSA SERPL-MCNC: 1.4 NG/ML (ref 0–4)
RBC # BLD AUTO: 5.12 X10E6/UL (ref 4.14–5.8)
SODIUM SERPL-SCNC: 141 MMOL/L (ref 134–144)
TRIGL SERPL-MCNC: 48 MG/DL (ref 0–149)
VLDLC SERPL CALC-MCNC: 7 MG/DL (ref 5–40)
WBC # BLD AUTO: 5.5 X10E3/UL (ref 3.4–10.8)

## 2022-02-15 ENCOUNTER — OFFICE VISIT (OUTPATIENT)
Dept: FAMILY MEDICINE CLINIC | Facility: CLINIC | Age: 62
End: 2022-02-15

## 2022-02-15 VITALS
OXYGEN SATURATION: 99 % | WEIGHT: 168.9 LBS | BODY MASS INDEX: 23.65 KG/M2 | HEART RATE: 84 BPM | HEIGHT: 71 IN | TEMPERATURE: 96.8 F | SYSTOLIC BLOOD PRESSURE: 145 MMHG | DIASTOLIC BLOOD PRESSURE: 85 MMHG

## 2022-02-15 DIAGNOSIS — Z00.00 HEALTH CARE MAINTENANCE: Primary | ICD-10-CM

## 2022-02-15 PROCEDURE — 99396 PREV VISIT EST AGE 40-64: CPT | Performed by: FAMILY MEDICINE

## 2022-02-15 RX ORDER — FEXOFENADINE HCL 180 MG/1
180 TABLET ORAL DAILY
COMMUNITY

## 2022-02-15 NOTE — PROGRESS NOTES
"Chief Complaint  Annual Exam    Subjective          Arnaud Franklin presents to [facility]  History of Present Illness     Annual healthcare maintenance visit.  Patient quit smoking about 20 years ago after 10-pack-year history.  No particular pulmonary concerns at this time.  He drinks alcohol few times a week.  Negative audit C screen.  He was checking his blood pressure at home earlier last year.  Blood pressures averaging about 130s systolic.  He has no cardiovascular symptoms but he continues exercise somewhat but he wishes to get exercise more.  He notices that when his stress level is less his blood pressure will be down.  His cholesterol remains improved.  The LDL is down from a peak of 170 after he started losing weight through diet and exercise.  His 10-year risk of atherosclerotic cardiovascular disease is intermediate at 8%.  He is down about 15 pounds since 2000.  No  symptoms.  No GI symptoms.  His colonoscopy about a month ago.    The 10-year ASCVD risk score (Bruce DE LA VEGA Jr., et al., 2013) is: 8.3%    Values used to calculate the score:      Age: 61 years      Sex: Male      Is Non- : No      Diabetic: No      Tobacco smoker: No      Systolic Blood Pressure: 145 mmHg      Is BP treated: No      HDL Cholesterol: 94 mg/dL      Total Cholesterol: 250 mg/dL      Objective   Vital Signs:   /85   Pulse 84   Temp 96.8 °F (36 °C) (Temporal)   Ht 180.3 cm (70.98\")   Wt 76.6 kg (168 lb 14.4 oz)   SpO2 99%   BMI 23.57 kg/m²     Physical Exam  Constitutional:       Appearance: Normal appearance.   HENT:      Head: Atraumatic.      Mouth/Throat:      Pharynx: Oropharynx is clear. No oropharyngeal exudate or posterior oropharyngeal erythema.   Eyes:      Conjunctiva/sclera: Conjunctivae normal.   Neck:      Thyroid: No thyroid mass, thyromegaly or thyroid tenderness.   Cardiovascular:      Rate and Rhythm: Normal rate and regular rhythm.      Pulses: " Normal pulses.      Heart sounds: Normal heart sounds.   Pulmonary:      Effort: Pulmonary effort is normal.      Breath sounds: Normal breath sounds.   Abdominal:      General: Abdomen is flat. There is no distension.      Palpations: Abdomen is soft. There is no mass.      Tenderness: There is no abdominal tenderness.      Hernia: No hernia is present.   Genitourinary:     Comments: Patient has a documented normal prostate exam during colonoscopy about a month ago.  Musculoskeletal:         General: Normal range of motion.      Cervical back: Normal range of motion and neck supple. No muscular tenderness.   Lymphadenopathy:      Cervical: No cervical adenopathy.   Skin:     General: Skin is warm and dry.      Findings: No rash.   Neurological:      General: No focal deficit present.      Mental Status: He is alert and oriented to person, place, and time.   Psychiatric:         Mood and Affect: Mood normal.         Behavior: Behavior normal.        Result Review :   The following data was reviewed by: Shahriar Coon MD on 02/15/2022:  Common labs    Common Labsle 8/5/21 8/5/21 2/8/22 2/8/22 2/8/22 2/8/22    0850 0850 0853 0853 0853 0853   Glucose 94    96    BUN 15    15    Creatinine 0.95    1.07    eGFR Non  Am 81    75    eGFR African Am 98    87    Sodium 138    141    Potassium 4.5    4.6    Chloride 102    103    Calcium 9.8    9.6    Total Protein 7.1    7.0    Albumin 4.70    4.3    Total Bilirubin 0.7    0.8    Alkaline Phosphatase 59    56    AST (SGOT) 20    19    ALT (SGPT) 19    12    WBC      5.5   Hemoglobin      16.4   Hematocrit      47.4   Platelets      216   Total Cholesterol  264 (A)  250 (A)     Triglycerides  60  48     HDL Cholesterol  101 (A)  94     LDL Cholesterol   154 (A)  149 (A)     PSA   1.4      (A) Abnormal value       Comments are available for some flowsheets but are not being displayed.                     Assessment and Plan    Diagnoses and all orders for this  visit:    1. Health care maintenance (Primary)      Annual healthcare maintenance visit.    Immunizations.  COVID up-to-date.  Offered Pneumovax 23 today.  Patient wants to wait till next year.    Colon cancer screening up-to-date.    Prostate cancer screening up-to-date.    Elevated blood pressure readings.  He may be checking his blood pressure at home on a regular basis and sending us numbers in the next few weeks.  At this time no direct indication for treatment.  Continue exercise.    Hyperlipidemia with intermediate cardiovascular risk based on ACC risk score.  He will continue working on his diet and exercise.  He has a very high HDL in the 90s.  This time no direct indication for statin medication.    Preventative health practices discussed including regular exercise.  I will see him back in 1 year sooner as needed      Follow Up   No follow-ups on file.  Patient was given instructions and counseling regarding his condition or for health maintenance advice. Please see specific information pulled into the AVS if appropriate.

## 2022-06-07 ENCOUNTER — OFFICE VISIT (OUTPATIENT)
Dept: FAMILY MEDICINE CLINIC | Facility: CLINIC | Age: 62
End: 2022-06-07

## 2022-06-07 VITALS
HEART RATE: 77 BPM | WEIGHT: 166 LBS | TEMPERATURE: 96.9 F | BODY MASS INDEX: 22.48 KG/M2 | HEIGHT: 72 IN | DIASTOLIC BLOOD PRESSURE: 85 MMHG | SYSTOLIC BLOOD PRESSURE: 137 MMHG | OXYGEN SATURATION: 100 %

## 2022-06-07 DIAGNOSIS — R05.9 COUGH: ICD-10-CM

## 2022-06-07 DIAGNOSIS — J06.9 VIRAL URI: Primary | ICD-10-CM

## 2022-06-07 LAB
EXPIRATION DATE: NORMAL
FLUAV AG UPPER RESP QL IA.RAPID: NOT DETECTED
FLUBV AG UPPER RESP QL IA.RAPID: NOT DETECTED
INTERNAL CONTROL: NORMAL
Lab: NORMAL
SARS-COV-2 AG UPPER RESP QL IA.RAPID: NOT DETECTED

## 2022-06-07 PROCEDURE — 99213 OFFICE O/P EST LOW 20 MIN: CPT | Performed by: FAMILY MEDICINE

## 2022-06-07 PROCEDURE — 87428 SARSCOV & INF VIR A&B AG IA: CPT | Performed by: FAMILY MEDICINE

## 2022-06-07 NOTE — PROGRESS NOTES
"Chief Complaint  Cough (Pt c/o severe cough, sore throat, and drainage x 1 week. At home COVID test neg 6/5/22)    Subjective        Arnaud Franklin presents to [facility]  History of Present Illness    6 days of URI symptoms.  [place]  Sore throat.  Congestion.  He states he is having a lot of sore throat.  Specially at nighttime.  With sinus postnasal drip and cough that keeps him awake.  He has used hydrocodone cough syrup in the past without issue.  He has had no fevers or chills.  He had a negative COVID-19 test about 3 or 4 days ago.  He is fully vaccinated.  And no other complaints today.  He has mild to moderate symptoms.  Bothers him more at nighttime.  No shortness of breath.      Objective   Vital Signs:  /85   Pulse 77   Temp 96.9 °F (36.1 °C)   Ht 182.9 cm (72\")   Wt 75.3 kg (166 lb)   SpO2 100%   BMI 22.51 kg/m²   Estimated body mass index is 22.51 kg/m² as calculated from the following:    Height as of this encounter: 182.9 cm (72\").    Weight as of this encounter: 75.3 kg (166 lb).           Physical Exam  Vitals and nursing note reviewed.   Constitutional:       General: He is not in acute distress.     Comments: No acute distress.  Nontoxic.   HENT:      Right Ear: Tympanic membrane, ear canal and external ear normal.      Left Ear: Tympanic membrane, ear canal and external ear normal.      Nose: Nose normal.      Mouth/Throat:      Pharynx: No oropharyngeal exudate.   Eyes:      General: No scleral icterus.        Right eye: No discharge.         Left eye: No discharge.      Conjunctiva/sclera: Conjunctivae normal.   Cardiovascular:      Rate and Rhythm: Normal rate.   Pulmonary:      Effort: Pulmonary effort is normal. No respiratory distress.      Breath sounds: Normal breath sounds. No stridor. No wheezing or rales.   Lymphadenopathy:      Cervical: No cervical adenopathy.   Skin:     Findings: No rash.        Result " Review :                Assessment and Plan   Diagnoses and all orders for this visit:    1. Viral URI (Primary)    Other orders  -     HYDROcod Polst-CPM Polst ER (Tussionex Pennkinetic ER) 10-8 MG/5ML ER suspension; Take 5 mL by mouth Every 12 (Twelve) Hours As Needed for Cough.  Dispense: 120 mL; Refill: 0        Viral URI syndrome, and then COVID-19.  At this time no indication for antibiotics.  He is nontoxic.  His pulmonary exam is unremarkable.  Recommend Tussionex syrup 1 teaspoon at nighttime for cough and insomnia.  He understands not to take this while driving.  He is aware to controlled substance and may be habit-forming.  With severe symptoms such as shaking chills, shortness of breath, or other severe symptoms he will seek medical attention.  The cough may go on 2 or 3 weeks.  He will call with concerns.         Follow Up   No follow-ups on file.  Patient was given instructions and counseling regarding his condition or for health maintenance advice. Please see specific information pulled into the AVS if appropriate.

## 2022-07-07 RX ORDER — PANTOPRAZOLE SODIUM 40 MG/1
TABLET, DELAYED RELEASE ORAL
Qty: 90 TABLET | Refills: 1 | Status: SHIPPED | OUTPATIENT
Start: 2022-07-07 | End: 2022-12-13

## 2022-07-07 NOTE — TELEPHONE ENCOUNTER
Rx Refill Note  Requested Prescriptions     Pending Prescriptions Disp Refills   • pantoprazole (PROTONIX) 40 MG EC tablet [Pharmacy Med Name: PANTOPRAZOLE TAB 40MG DR] 90 tablet 1     Sig: TAKE 1 TABLET DAILY      Last office visit with prescribing clinician: 6/7/2022      Next office visit with prescribing clinician: 2/16/2023            Toya Jimenez MA  07/07/22, 12:28 EDT

## 2022-09-20 ENCOUNTER — OFFICE VISIT (OUTPATIENT)
Dept: FAMILY MEDICINE CLINIC | Facility: CLINIC | Age: 62
End: 2022-09-20

## 2022-09-20 VITALS
HEART RATE: 82 BPM | HEIGHT: 71 IN | DIASTOLIC BLOOD PRESSURE: 90 MMHG | BODY MASS INDEX: 23.09 KG/M2 | TEMPERATURE: 96.9 F | WEIGHT: 164.9 LBS | OXYGEN SATURATION: 97 % | SYSTOLIC BLOOD PRESSURE: 139 MMHG

## 2022-09-20 DIAGNOSIS — R05.9 COUGH: Primary | ICD-10-CM

## 2022-09-20 DIAGNOSIS — J06.9 VIRAL URI: ICD-10-CM

## 2022-09-20 LAB
EXPIRATION DATE: NORMAL
INTERNAL CONTROL: NORMAL
Lab: NORMAL
SARS-COV-2 AG UPPER RESP QL IA.RAPID: NOT DETECTED

## 2022-09-20 PROCEDURE — 99213 OFFICE O/P EST LOW 20 MIN: CPT | Performed by: FAMILY MEDICINE

## 2022-09-20 PROCEDURE — 87426 SARSCOV CORONAVIRUS AG IA: CPT | Performed by: FAMILY MEDICINE

## 2022-09-20 RX ORDER — AZITHROMYCIN 250 MG/1
TABLET, FILM COATED ORAL
Qty: 6 TABLET | Refills: 0 | Status: SHIPPED | OUTPATIENT
Start: 2022-09-20 | End: 2023-03-22

## 2022-11-30 ENCOUNTER — OFFICE VISIT (OUTPATIENT)
Dept: FAMILY MEDICINE CLINIC | Facility: CLINIC | Age: 62
End: 2022-11-30

## 2022-11-30 VITALS
HEART RATE: 82 BPM | HEIGHT: 71 IN | DIASTOLIC BLOOD PRESSURE: 84 MMHG | TEMPERATURE: 97.5 F | BODY MASS INDEX: 23.52 KG/M2 | SYSTOLIC BLOOD PRESSURE: 143 MMHG | OXYGEN SATURATION: 99 % | WEIGHT: 168 LBS

## 2022-11-30 DIAGNOSIS — H69.83 DYSFUNCTION OF BOTH EUSTACHIAN TUBES: Primary | ICD-10-CM

## 2022-11-30 DIAGNOSIS — Z23 INFLUENZA VACCINE ADMINISTERED: ICD-10-CM

## 2022-11-30 PROCEDURE — 90471 IMMUNIZATION ADMIN: CPT | Performed by: FAMILY MEDICINE

## 2022-11-30 PROCEDURE — 90686 IIV4 VACC NO PRSV 0.5 ML IM: CPT | Performed by: FAMILY MEDICINE

## 2022-11-30 PROCEDURE — 99213 OFFICE O/P EST LOW 20 MIN: CPT | Performed by: FAMILY MEDICINE

## 2022-11-30 RX ORDER — AZELASTINE 1 MG/ML
2 SPRAY, METERED NASAL 2 TIMES DAILY
Qty: 30 ML | Refills: 12 | Status: SHIPPED | OUTPATIENT
Start: 2022-11-30

## 2022-11-30 NOTE — PROGRESS NOTES
"Chief Complaint  Earache (Bilateral ear pressure and popping wants ent referral )    Subjective        Arnaud Franklin presents to [facility]  History of Present Illness     Bilateral ear discomfort.  No pain but there is some pressure in the left ear.  Some crackling in the right ear.  Some decreased hearing on the left ear.  Has had that may be long time.  But the symptoms overall started in September when he had a URI syndrome.  He is taking Astelin nasal spray that he bought over-the-counter which seems to be helping over the last couple weeks.  But is not taking it every day.  He also continues fexofenadine and fluticasone nasal spray.  No vertigo.  No tinnitus.  He otherwise feels well.    Objective   Vital Signs:  /84   Pulse 82   Temp 97.5 °F (36.4 °C) (Temporal)   Ht 180.3 cm (70.98\")   Wt 76.2 kg (168 lb)   SpO2 99%   BMI 23.44 kg/m²   Estimated body mass index is 23.44 kg/m² as calculated from the following:    Height as of this encounter: 180.3 cm (70.98\").    Weight as of this encounter: 76.2 kg (168 lb).    BMI is within normal parameters. No other follow-up for BMI required.      Physical Exam  HENT:      Head:      Comments: The right tympanic membrane is slightly bulging with may be some fluid.  Minimal erythema.  The left tabetic membrane is unremarkable.  External canals are unremarkable.  No oral cavity lesions.  No head neck adenopathy or mass palpable.       Result Review :                Assessment and Plan   Diagnoses and all orders for this visit:    1. Dysfunction of both eustachian tubes (Primary)  -     Ambulatory Referral to ENT (Otolaryngology)    Other orders  -     azelastine (ASTELIN) 0.1 % nasal spray; 2 sprays into the nostril(s) as directed by provider 2 (Two) Times a Day. Use in each nostril as directed  Dispense: 30 mL; Refill: 12      Bilateral eustachian tube dysfunction.  Remote history of smoking, quit about 15 years ago.  I am " recommending ENT evaluation for more detailed nasopharyngeal examination and consultation.  I want him to take the Astelin nasal spray daily.  He can continue the fluticasone.  This should resolve spontaneously, but may need further investigation pending ENT work-up.       Follow Up   No follow-ups on file.  Patient was given instructions and counseling regarding his condition or for health maintenance advice. Please see specific information pulled into the AVS if appropriate.

## 2022-12-13 RX ORDER — PANTOPRAZOLE SODIUM 40 MG/1
TABLET, DELAYED RELEASE ORAL
Qty: 90 TABLET | Refills: 1 | Status: SHIPPED | OUTPATIENT
Start: 2022-12-13 | End: 2023-04-06 | Stop reason: SDUPTHER

## 2022-12-13 NOTE — TELEPHONE ENCOUNTER
Rx Refill Note  Requested Prescriptions     Pending Prescriptions Disp Refills   • pantoprazole (PROTONIX) 40 MG EC tablet [Pharmacy Med Name: PANTOPRAZOLE TAB 40MG DR] 90 tablet 1     Sig: TAKE 1 TABLET DAILY      Last office visit with prescribing clinician: 11/30/2022   Last telemedicine visit with prescribing clinician: 2/14/2023   Next office visit with prescribing clinician: 2/16/2023                         Would you like a call back once the refill request has been completed: [] Yes [] No    If the office needs to give you a call back, can they leave a voicemail: [] Yes [] No    Lakhwinder Coy  12/13/22, 08:45 EST

## 2023-02-06 ENCOUNTER — TELEPHONE (OUTPATIENT)
Dept: FAMILY MEDICINE CLINIC | Facility: CLINIC | Age: 63
End: 2023-02-06
Payer: COMMERCIAL

## 2023-02-06 DIAGNOSIS — Z00.00 HEALTH CARE MAINTENANCE: ICD-10-CM

## 2023-02-06 DIAGNOSIS — E78.5 HYPERLIPIDEMIA, UNSPECIFIED HYPERLIPIDEMIA TYPE: Primary | ICD-10-CM

## 2023-02-06 DIAGNOSIS — Z12.5 SCREENING PSA (PROSTATE SPECIFIC ANTIGEN): ICD-10-CM

## 2023-03-22 ENCOUNTER — OFFICE VISIT (OUTPATIENT)
Dept: FAMILY MEDICINE CLINIC | Facility: CLINIC | Age: 63
End: 2023-03-22
Payer: COMMERCIAL

## 2023-03-22 VITALS
TEMPERATURE: 97.5 F | HEART RATE: 83 BPM | OXYGEN SATURATION: 100 % | DIASTOLIC BLOOD PRESSURE: 76 MMHG | WEIGHT: 168.7 LBS | HEIGHT: 71 IN | BODY MASS INDEX: 23.62 KG/M2 | RESPIRATION RATE: 16 BRPM | SYSTOLIC BLOOD PRESSURE: 130 MMHG

## 2023-03-22 DIAGNOSIS — R50.9 FEVER, UNSPECIFIED FEVER CAUSE: ICD-10-CM

## 2023-03-22 DIAGNOSIS — J02.9 SORE THROAT: Primary | ICD-10-CM

## 2023-03-22 LAB
EXPIRATION DATE: NORMAL
EXPIRATION DATE: NORMAL
FLUAV AG UPPER RESP QL IA.RAPID: NOT DETECTED
FLUBV AG UPPER RESP QL IA.RAPID: NOT DETECTED
INTERNAL CONTROL: NORMAL
INTERNAL CONTROL: NORMAL
Lab: NORMAL
Lab: NORMAL
S PYO AG THROAT QL: NEGATIVE
SARS-COV-2 AG UPPER RESP QL IA.RAPID: NOT DETECTED

## 2023-03-22 PROCEDURE — 87880 STREP A ASSAY W/OPTIC: CPT | Performed by: INTERNAL MEDICINE

## 2023-03-22 PROCEDURE — 99213 OFFICE O/P EST LOW 20 MIN: CPT | Performed by: INTERNAL MEDICINE

## 2023-03-22 PROCEDURE — 87428 SARSCOV & INF VIR A&B AG IA: CPT | Performed by: INTERNAL MEDICINE

## 2023-03-22 NOTE — PROGRESS NOTES
"Chief Complaint  Sore Throat, Headache, Chills (/), and Fever (Pt stated started late yesterday evening/ )    Subjective        Arnaud Franklin presents to [facility]  History of Present Illness  Malaise yesterday and slight ha.  Throat is dry and fever and chills started in the night.  Today temp was 100 degrees  3 hours after ibuprofen.  No cough.  PND present.  Takes flonase and allegra daily and astelin prn.  + Ill contacts at home with his 5-year-old grandson that had a fever over the weekend and he was exposed to him.  Objective   Vital Signs:  /76 (BP Location: Left arm, Patient Position: Sitting, Cuff Size: Small Adult)   Pulse 83   Temp 97.5 °F (36.4 °C) (Temporal)   Resp 16   Ht 180.3 cm (70.98\")   Wt 76.5 kg (168 lb 11.2 oz)   SpO2 100%   BMI 23.54 kg/m²   Estimated body mass index is 23.54 kg/m² as calculated from the following:    Height as of this encounter: 180.3 cm (70.98\").    Weight as of this encounter: 76.5 kg (168 lb 11.2 oz).       BMI is within normal parameters. No other follow-up for BMI required.      Physical Exam  Vitals and nursing note reviewed.   Constitutional:       General: He is not in acute distress.     Appearance: He is well-developed. He is not ill-appearing, toxic-appearing or diaphoretic.   HENT:      Head: Normocephalic and atraumatic. Hair is normal.      Right Ear: Ear canal and external ear normal. No drainage, swelling or tenderness. Tympanic membrane is not retracted.      Left Ear: Ear canal and external ear normal. No drainage, swelling or tenderness. Tympanic membrane is not retracted.      Ears:      Comments: TM normal       Nose: Mucosal edema present.      Mouth/Throat:      Mouth: No oral lesions.      Pharynx: Uvula midline. Posterior oropharyngeal erythema present. No pharyngeal swelling, oropharyngeal exudate or uvula swelling.      Tonsils: No tonsillar exudate or tonsillar abscesses.   Eyes:      General: No scleral " icterus.        Right eye: No discharge.         Left eye: No discharge.      Conjunctiva/sclera: Conjunctivae normal.      Pupils: Pupils are equal, round, and reactive to light.   Cardiovascular:      Rate and Rhythm: Normal rate and regular rhythm.      Heart sounds: Normal heart sounds. No murmur heard.    No gallop.   Pulmonary:      Effort: No respiratory distress.      Breath sounds: Normal breath sounds. No stridor. No wheezing or rales.   Chest:      Chest wall: No tenderness.   Abdominal:      Palpations: Abdomen is soft.   Musculoskeletal:         General: No deformity. Normal range of motion.      Cervical back: Normal range of motion and neck supple.   Lymphadenopathy:      Cervical: No cervical adenopathy.   Skin:     General: Skin is warm and dry.      Findings: No rash.   Neurological:      Mental Status: He is alert and oriented to person, place, and time.      Motor: No abnormal muscle tone.   Psychiatric:         Mood and Affect: Mood normal.         Behavior: Behavior normal.         Thought Content: Thought content normal.         Judgment: Judgment normal.        Result Review :                   Assessment and Plan   Diagnoses and all orders for this visit:    1. Sore throat (Primary)  -     POC Rapid Strep A    2. Fever, unspecified fever cause  -     POCT SARS-CoV-2 Antigen MANOJ + Flu      Viral illness with ST and fever and body aches.  Covid, flu and strep are negative.  Most likely he has what his grandson had over weekend.  Supportive treatment with fluids, tylenol alternating with advil and he will start astelin.  If sx persist or worsen, recommend recheck covid and possibly strep.  He has f/u with Dr. Coon next week for his CPE.         Follow Up   No follow-ups on file.  Patient was given instructions and counseling regarding his condition or for health maintenance advice. Please see specific information pulled into the AVS if appropriate.

## 2023-03-28 ENCOUNTER — OFFICE VISIT (OUTPATIENT)
Dept: FAMILY MEDICINE CLINIC | Facility: CLINIC | Age: 63
End: 2023-03-28
Payer: COMMERCIAL

## 2023-03-28 VITALS
DIASTOLIC BLOOD PRESSURE: 79 MMHG | SYSTOLIC BLOOD PRESSURE: 138 MMHG | HEIGHT: 71 IN | TEMPERATURE: 97.7 F | BODY MASS INDEX: 23.8 KG/M2 | WEIGHT: 170 LBS | OXYGEN SATURATION: 98 % | HEART RATE: 68 BPM

## 2023-03-28 DIAGNOSIS — Z12.5 SCREENING PSA (PROSTATE SPECIFIC ANTIGEN): ICD-10-CM

## 2023-03-28 DIAGNOSIS — Z12.2 ENCOUNTER FOR SCREENING FOR LUNG CANCER: ICD-10-CM

## 2023-03-28 DIAGNOSIS — Z00.00 HEALTH CARE MAINTENANCE: Primary | ICD-10-CM

## 2023-03-28 PROCEDURE — 99396 PREV VISIT EST AGE 40-64: CPT | Performed by: FAMILY MEDICINE

## 2023-03-28 NOTE — PROGRESS NOTES
"Chief Complaint  Annual Exam    Subjective        Arnaud Franklin presents to [facility]  History of Present Illness     Annual healthcare maintenance visit.  History of smoking.  Had about a 20-pack-year history.  Quit about 12 years ago.  No symptomatology.  No heavy alcohol use.  He is very physically active.  Recently retired.  Had a bit of [place] pain [place]  No cough.  No shortness of breath.  He had a URI syndrome last week, seen by one of our providers.  Negative strep test.  Felt better later that day.  No antibiotics.  No concerns about his health otherwise today.  Reviewed lab work.  Only CBC done.  Other lab work was ordered.  Lab did not do.    Objective   Vital Signs:  /79   Pulse 68   Temp 97.7 °F (36.5 °C) (Temporal)   Ht 180.3 cm (70.98\")   Wt 77.1 kg (170 lb)   SpO2 98%   BMI 23.72 kg/m²   Estimated body mass index is 23.72 kg/m² as calculated from the following:    Height as of this encounter: 180.3 cm (70.98\").    Weight as of this encounter: 77.1 kg (170 lb).       BMI is within normal parameters. No other follow-up for BMI required.      Physical Exam  Constitutional:       Appearance: Normal appearance.   HENT:      Head: Atraumatic.      Mouth/Throat:      Pharynx: Oropharynx is clear. No oropharyngeal exudate or posterior oropharyngeal erythema.   Eyes:      Conjunctiva/sclera: Conjunctivae normal.   Neck:      Thyroid: No thyroid mass, thyromegaly or thyroid tenderness.   Cardiovascular:      Rate and Rhythm: Normal rate and regular rhythm.      Pulses: Normal pulses.      Heart sounds: Normal heart sounds.   Pulmonary:      Effort: Pulmonary effort is normal.      Breath sounds: Normal breath sounds.   Abdominal:      General: Abdomen is flat. There is no distension.      Palpations: Abdomen is soft. There is no mass.      Tenderness: There is no abdominal tenderness.      Hernia: No hernia is present. "   Genitourinary:     Comments: Patient did not want to have a prostate exam done.  No  symptoms.  Musculoskeletal:         General: Normal range of motion.      Cervical back: Normal range of motion and neck supple. No muscular tenderness.   Lymphadenopathy:      Cervical: No cervical adenopathy.   Skin:     General: Skin is warm and dry.      Findings: No rash.   Neurological:      General: No focal deficit present.      Mental Status: He is alert and oriented to person, place, and time.   Psychiatric:         Mood and Affect: Mood normal.        Result Review :  The following data was reviewed by: Shahriar Coon MD on 03/28/2023:  Common labs    Common Labs 2/14/23   WBC 5.72   Hemoglobin 16.0   Hematocrit 45.2   Platelets 222                        Assessment and Plan   Diagnoses and all orders for this visit:    1. Health care maintenance (Primary)  -     Comprehensive Metabolic Panel  -     Lipid Panel  -     PSA Screen  -     Comprehensive Metabolic Panel; Future  -     CBC & Differential; Future  -     Lipid Panel; Future  -     PSA Screen; Future    2. Screening PSA (prostate specific antigen)  -     PSA Screen  -     PSA Screen; Future    3. Encounter for screening for lung cancer  -      CT Chest Low Dose Cancer Screening WO; Future      Annual healthcare maintenance lamination.    Immunizations.  Up-to-date.    Colon cancer screening up-to-date.    Prostate screening.  Checking PSA today.    Back pain.  Likely musculoskeletal.  Unremarkable pulmonary and musculoskeletal exam today.    Checking lipid screen today along with other lab work.    Lung cancer screening.  Benefits of CT scan outweigh risk.  Discussed in detail.    Follow-up in 1 year.  Sooner as needed.    Check blood pressure on a regular basis.           Follow Up   No follow-ups on file.  Patient was given instructions and counseling regarding his condition or for health maintenance advice. Please see specific information pulled into the  AVS if appropriate.

## 2023-03-29 LAB
ALBUMIN SERPL-MCNC: 4.3 G/DL (ref 3.5–5.2)
ALBUMIN/GLOB SERPL: 1.7 G/DL
ALP SERPL-CCNC: 56 U/L (ref 39–117)
ALT SERPL-CCNC: 15 U/L (ref 1–41)
AST SERPL-CCNC: 19 U/L (ref 1–40)
BILIRUB SERPL-MCNC: 0.6 MG/DL (ref 0–1.2)
BUN SERPL-MCNC: 13 MG/DL (ref 8–23)
BUN/CREAT SERPL: 15.3 (ref 7–25)
CALCIUM SERPL-MCNC: 9.9 MG/DL (ref 8.6–10.5)
CHLORIDE SERPL-SCNC: 104 MMOL/L (ref 98–107)
CHOLEST SERPL-MCNC: 245 MG/DL (ref 0–200)
CO2 SERPL-SCNC: 28.6 MMOL/L (ref 22–29)
CREAT SERPL-MCNC: 0.85 MG/DL (ref 0.76–1.27)
EGFRCR SERPLBLD CKD-EPI 2021: 98.9 ML/MIN/1.73
GLOBULIN SER CALC-MCNC: 2.6 GM/DL
GLUCOSE SERPL-MCNC: 83 MG/DL (ref 65–99)
HDLC SERPL-MCNC: 101 MG/DL (ref 40–60)
LDLC SERPL CALC-MCNC: 136 MG/DL (ref 0–100)
POTASSIUM SERPL-SCNC: 4.9 MMOL/L (ref 3.5–5.2)
PROT SERPL-MCNC: 6.9 G/DL (ref 6–8.5)
PSA SERPL-MCNC: 2.17 NG/ML (ref 0–4)
SODIUM SERPL-SCNC: 140 MMOL/L (ref 136–145)
TRIGL SERPL-MCNC: 49 MG/DL (ref 0–150)
VLDLC SERPL CALC-MCNC: 8 MG/DL (ref 5–40)

## 2023-04-06 ENCOUNTER — TELEPHONE (OUTPATIENT)
Dept: FAMILY MEDICINE CLINIC | Facility: CLINIC | Age: 63
End: 2023-04-06
Payer: COMMERCIAL

## 2023-04-06 RX ORDER — PANTOPRAZOLE SODIUM 40 MG/1
40 TABLET, DELAYED RELEASE ORAL DAILY
Qty: 90 TABLET | Refills: 1 | Status: SHIPPED | OUTPATIENT
Start: 2023-04-06

## 2023-04-06 NOTE — TELEPHONE ENCOUNTER
Caller: Rigoberto Arnaud R    Relationship: Self    Best call back number: [phone]    Requested Prescriptions:   Requested Prescriptions     Pending Prescriptions Disp Refills   • pantoprazole (PROTONIX) 40 MG EC tablet 90 tablet 1     Sig: Take 1 tablet by mouth Daily.        Pharmacy where request should be sent: [facility] - [place], [place] - [address] [facility] [phone] [facility] [phone] FX     Last office visit with prescribing clinician: 3/28/2023   Last telemedicine visit with prescribing clinician: Visit date not found   Next office visit with prescribing clinician: 4/8/2024     Additional details provided by patient: NEW PHARMACY     Does the patient have less than a 3 day supply:  [] Yes  [x] No      Victoriano Ca Rep   04/06/23 10:16 EDT

## 2023-04-06 NOTE — TELEPHONE ENCOUNTER
Rx Refill Note  Requested Prescriptions     Pending Prescriptions Disp Refills   • pantoprazole (PROTONIX) 40 MG EC tablet 90 tablet 1     Sig: Take 1 tablet by mouth Daily.      Last office visit with prescribing clinician: 3/28/2023   Last telemedicine visit with prescribing clinician: Visit date not found   Next office visit with prescribing clinician: 4/8/2024                         Would you like a call back once the refill request has been completed: [] Yes [] No    If the office needs to give you a call back, can they leave a voicemail: [] Yes [] No    Lakhwinder Coy  04/06/23, 11:04 EDT

## 2023-04-10 ENCOUNTER — OFFICE VISIT (OUTPATIENT)
Dept: FAMILY MEDICINE CLINIC | Facility: CLINIC | Age: 63
End: 2023-04-10
Payer: COMMERCIAL

## 2023-04-10 VITALS
OXYGEN SATURATION: 98 % | DIASTOLIC BLOOD PRESSURE: 92 MMHG | WEIGHT: 168.4 LBS | TEMPERATURE: 97.3 F | HEART RATE: 74 BPM | BODY MASS INDEX: 23.57 KG/M2 | HEIGHT: 71 IN | SYSTOLIC BLOOD PRESSURE: 154 MMHG

## 2023-04-10 DIAGNOSIS — R35.0 FREQUENT URINATION: Primary | ICD-10-CM

## 2023-04-10 DIAGNOSIS — R10.9 FLANK PAIN: ICD-10-CM

## 2023-04-10 LAB
BILIRUB BLD-MCNC: NEGATIVE MG/DL
CLARITY, POC: CLEAR
COLOR UR: NORMAL
EXPIRATION DATE: NORMAL
GLUCOSE UR STRIP-MCNC: NEGATIVE MG/DL
KETONES UR QL: NEGATIVE
LEUKOCYTE EST, POC: NEGATIVE
Lab: NORMAL
NITRITE UR-MCNC: NEGATIVE MG/ML
PH UR: 5.5 [PH] (ref 5–8)
PROT UR STRIP-MCNC: NEGATIVE MG/DL
RBC # UR STRIP: NEGATIVE /UL
SP GR UR: 1.02 (ref 1–1.03)
UROBILINOGEN UR QL: NORMAL

## 2023-04-10 PROCEDURE — 81003 URINALYSIS AUTO W/O SCOPE: CPT | Performed by: FAMILY MEDICINE

## 2023-04-10 PROCEDURE — 99214 OFFICE O/P EST MOD 30 MIN: CPT | Performed by: FAMILY MEDICINE

## 2023-04-10 RX ORDER — MELOXICAM 15 MG/1
15 TABLET ORAL DAILY PRN
Qty: 30 TABLET | Refills: 0 | Status: SHIPPED | OUTPATIENT
Start: 2023-04-10

## 2023-04-10 NOTE — PROGRESS NOTES
"Chief Complaint  Urinary Frequency (X 48hrs ), Back Pain (X 1 week ), and Groin Pain (X 48hrs )    Subjective        Arnaud Franklin presents to [facility]  History of Present Illness    About 3 to 4 weeks of ongoing bilateral upper and lower back pain.  More on the right than the left.  In the last 48 hours increased urination with urinary pressure but no urinary pain.  No burning with urination.  No fevers.  No chills.  He does not feel ill.  No trauma.  No other constitutional symptoms.  The pain has been mild.  Not worse with certain movements.  Does bother him at nighttime sometimes.  He has remote history of kidney stones, he states that pain was much worse.  He states he was up every hour last night urinating.  No saddle anesthesia.  No lower extremity pain or weakness or numbness.  Objective   Vital Signs:  /92   Pulse 74   Temp 97.3 °F (36.3 °C) (Temporal)   Ht 180.3 cm (70.98\")   Wt 76.4 kg (168 lb 6.4 oz)   SpO2 98%   BMI 23.50 kg/m²   Estimated body mass index is 23.5 kg/m² as calculated from the following:    Height as of this encounter: 180.3 cm (70.98\").    Weight as of this encounter: 76.4 kg (168 lb 6.4 oz).       BMI is within normal parameters. No other follow-up for BMI required.      Physical Exam  Constitutional:       General: He is not in acute distress.     Appearance: He is not ill-appearing, toxic-appearing or diaphoretic.      Comments: He is in no acute distress.  Smiling and relaxed.   Genitourinary:     Comments: Prostate exam unremarkable other than slightly enlarged.  Nontender.  Normal consistency.  No nodules.  Musculoskeletal:      Comments: He is able to ambulate and move without any visible discomfort.  His thoracic and lumbar spine has no pain to palpation.  No CVA tenderness.  No rash.  No point tenderness.  No spasm.  Abdomen is soft nontender no organomegaly no mass.  No suprapubic pain to palpation.  Hips full range of motion.  No " pain with compression of the SI joint.  Gait unremarkable.        Result Review :            Urine dipstick today is completely unremarkable.    Recent PSA less than 4.           Assessment and Plan   Diagnoses and all orders for this visit:    1. Frequent urination (Primary)  -     POC Urinalysis Dipstick, Automated  -     CT Abdomen Pelvis Stone Protocol; Future    2. Flank pain  -     CT Abdomen Pelvis Stone Protocol; Future    Other orders  -     meloxicam (MOBIC) 15 MG tablet; Take 1 tablet by mouth Daily As Needed (back pain).  Dispense: 30 tablet; Refill: 0      Bilateral flank pain and urinary urgency.  Possibly referred pain?  Less likely renal colic.  Likely not prostatitis.  Likely not urinary tract infection.  Holding off antibiotics.  Prescribing anti-inflammatory pain medicine.  Also checking CT stone protocol.  With severe symptoms he is going to seek medical attention immediately, including emergency room if needed.  Otherwise we will see him back as scheduled.         Follow Up   No follow-ups on file.  Patient was given instructions and counseling regarding his condition or for health maintenance advice. Please see specific information pulled into the AVS if appropriate.

## 2023-05-08 ENCOUNTER — HOSPITAL ENCOUNTER (OUTPATIENT)
Dept: CT IMAGING | Facility: HOSPITAL | Age: 63
Discharge: HOME | End: 2023-05-08
Admitting: FAMILY MEDICINE
Payer: COMMERCIAL

## 2023-05-08 DIAGNOSIS — R10.9 FLANK PAIN: ICD-10-CM

## 2023-05-08 DIAGNOSIS — R35.0 FREQUENT URINATION: ICD-10-CM

## 2023-05-08 PROCEDURE — 74176 CT ABD & PELVIS W/O CONTRAST: CPT

## 2023-05-12 ENCOUNTER — TELEPHONE (OUTPATIENT)
Dept: FAMILY MEDICINE CLINIC | Facility: CLINIC | Age: 63
End: 2023-05-12

## 2023-05-12 NOTE — TELEPHONE ENCOUNTER
Spoke with patient he is doing better   CT is clear of any hydronephrosis or obstruction he has some renal cyst likely benign  Etiology of his pain is unknown    He thinks it is likely from his yard work, he is improving, much better  Should he have any increased chronic or focal lumbar pain back pain or any worsening symptoms he should follow-up with Dr. Reyna to discuss these  further.

## 2023-05-12 NOTE — TELEPHONE ENCOUNTER
Caller: Arnaud Franklin    Relationship: Self    Best call back number:[phone]    Caller requesting test results: PATIENT    What test was performed: CT LOWER ABDOMEN     When was the test performed: 5/8/23    Where was the test performed: [facility] [place]     Additional notes:

## 2023-06-12 ENCOUNTER — HOSPITAL ENCOUNTER (OUTPATIENT)
Dept: CT IMAGING | Facility: HOSPITAL | Age: 63
Discharge: HOME | End: 2023-06-12
Admitting: FAMILY MEDICINE
Payer: COMMERCIAL

## 2023-06-12 DIAGNOSIS — Z12.2 ENCOUNTER FOR SCREENING FOR LUNG CANCER: ICD-10-CM

## 2023-06-12 PROCEDURE — 71271 CT THORAX LUNG CANCER SCR C-: CPT

## 2023-09-01 RX ORDER — PANTOPRAZOLE SODIUM 40 MG/1
40 TABLET, DELAYED RELEASE ORAL DAILY
Qty: 90 TABLET | Refills: 1 | Status: SHIPPED | OUTPATIENT
Start: 2023-09-01

## 2023-09-01 NOTE — TELEPHONE ENCOUNTER
Rx Refill Note  Requested Prescriptions     Pending Prescriptions Disp Refills    pantoprazole (PROTONIX) 40 MG EC tablet [Pharmacy Med Name: Pantoprazole Sodium 40 MG Oral Tablet Delayed Release] 90 tablet 1     Sig: TAKE 1 TABLET BY MOUTH DAILY      Last office visit with prescribing clinician: 4/10/2023   Last telemedicine visit with prescribing clinician: Visit date not found   Next office visit with prescribing clinician: 4/8/2024                         Would you like a call back once the refill request has been completed: [] Yes [] No    If the office needs to give you a call back, can they leave a voicemail: [] Yes [] No    Lakhwinder Coy  09/01/23, 10:26 EDT

## 2023-12-08 ENCOUNTER — OFFICE VISIT (OUTPATIENT)
Dept: FAMILY MEDICINE CLINIC | Facility: CLINIC | Age: 63
End: 2023-12-08
Payer: COMMERCIAL

## 2023-12-08 VITALS
TEMPERATURE: 97.7 F | OXYGEN SATURATION: 98 % | BODY MASS INDEX: 23.5 KG/M2 | HEART RATE: 83 BPM | HEIGHT: 71 IN | DIASTOLIC BLOOD PRESSURE: 89 MMHG | SYSTOLIC BLOOD PRESSURE: 158 MMHG

## 2023-12-08 DIAGNOSIS — Z12.5 SCREENING PSA (PROSTATE SPECIFIC ANTIGEN): ICD-10-CM

## 2023-12-08 DIAGNOSIS — U07.1 COVID-19: Primary | ICD-10-CM

## 2023-12-08 DIAGNOSIS — Z00.00 HEALTH CARE MAINTENANCE: ICD-10-CM

## 2023-12-08 DIAGNOSIS — R05.9 COUGH, UNSPECIFIED TYPE: ICD-10-CM

## 2023-12-08 LAB
EXPIRATION DATE: ABNORMAL
FLUAV AG UPPER RESP QL IA.RAPID: NOT DETECTED
FLUBV AG UPPER RESP QL IA.RAPID: NOT DETECTED
INTERNAL CONTROL: ABNORMAL
Lab: ABNORMAL
SARS-COV-2 AG UPPER RESP QL IA.RAPID: DETECTED

## 2023-12-08 NOTE — PROGRESS NOTES
"Chief Complaint  URI (WIFE HAS COVID /ONSET OF SYMPTOMS STARTED WEDNESDAY NEG COVID 12/7/23)    Subjective        Arnaud Franklin presents to [facility]  History of Present Illness    Day 2 of URI syndrome.  Wife has COVID.  Patient has history of borderline elevated blood pressure age greater than 60.  Normal creatinine.  No recent fever but feels rough.  Some sinus congestion some sore throat and some cough.  No shortness of breath.  Cough kept him awake last night.    Objective   Vital Signs:  /89   Pulse 83   Temp 97.7 °F (36.5 °C)   Ht 180.3 cm (70.98\")   SpO2 98%   BMI 23.50 kg/m²   Estimated body mass index is 23.5 kg/m² as calculated from the following:    Height as of this encounter: 180.3 cm (70.98\").    Weight as of 4/10/23: 76.4 kg (168 lb 6.4 oz).       BMI is within normal parameters. No other follow-up for BMI required.      Physical Exam  Constitutional:       Comments: He looks tired but nontoxic   HENT:      Mouth/Throat:      Mouth: Mucous membranes are moist.      Pharynx: Oropharynx is clear. No oropharyngeal exudate or posterior oropharyngeal erythema.   Cardiovascular:      Rate and Rhythm: Normal rate.   Pulmonary:      Effort: Pulmonary effort is normal. No respiratory distress.      Breath sounds: Normal breath sounds. No stridor. No wheezing, rhonchi or rales.        Result Review :                   Assessment and Plan   Diagnoses and all orders for this visit:    1. COVID-19 (Primary)    2. Cough, unspecified type  -     POCT SARS-CoV-2 Antigen MANOJ + Flu    3. Health care maintenance  -     Comprehensive Metabolic Panel; Future  -     CBC & Differential; Future  -     Lipid Panel; Future  -     PSA Screen; Future    4. Screening PSA (prostate specific antigen)  -     PSA Screen; Future    Other orders  -     Nirmatrelvir & Ritonavir, 300mg/100mg, (PAXLOVID) 20 x 150 MG & 10 x 100MG tablet therapy pack tablet; Take 3 tablets by mouth 2 (Two) Times a " Day for 5 days.  Dispense: 30 tablet; Refill: 0      Moderate COVID-19 URI syndrome with some risk factors.  I am recommending Paxlovid.  Explained indication.  Explained FDA emergency use authorization.  Explained side effects.  Explained efficacy.  Benefits outweigh risks.  No definitive drug drug interaction seen.  Patient counseled to go to the emergency room with severe symptoms such as shortness of breath or high fever.  Otherwise rest and hydration.  Quarantine guidelines discussed.  Call with concerns.  Mucinex DM okay.         Follow Up   No follow-ups on file.  Patient was given instructions and counseling regarding his condition or for health maintenance advice. Please see specific information pulled into the AVS if appropriate.

## 2024-03-04 RX ORDER — PANTOPRAZOLE SODIUM 40 MG/1
40 TABLET, DELAYED RELEASE ORAL DAILY
Qty: 90 TABLET | Refills: 3 | Status: SHIPPED | OUTPATIENT
Start: 2024-03-04

## 2024-04-15 ENCOUNTER — OFFICE VISIT (OUTPATIENT)
Dept: FAMILY MEDICINE CLINIC | Facility: CLINIC | Age: 64
End: 2024-04-15
Payer: COMMERCIAL

## 2024-04-15 VITALS
OXYGEN SATURATION: 96 % | DIASTOLIC BLOOD PRESSURE: 81 MMHG | HEART RATE: 74 BPM | BODY MASS INDEX: 24.04 KG/M2 | HEIGHT: 71 IN | TEMPERATURE: 97.8 F | SYSTOLIC BLOOD PRESSURE: 140 MMHG | WEIGHT: 171.7 LBS

## 2024-04-15 DIAGNOSIS — Z12.5 SCREENING PSA (PROSTATE SPECIFIC ANTIGEN): ICD-10-CM

## 2024-04-15 DIAGNOSIS — Z00.00 HEALTH CARE MAINTENANCE: Primary | ICD-10-CM

## 2024-04-15 DIAGNOSIS — Z87.891 SMOKING HISTORY: ICD-10-CM

## 2024-04-15 DIAGNOSIS — K21.9 GASTROESOPHAGEAL REFLUX DISEASE WITHOUT ESOPHAGITIS: Chronic | ICD-10-CM

## 2024-04-15 PROCEDURE — 99396 PREV VISIT EST AGE 40-64: CPT | Performed by: FAMILY MEDICINE

## 2024-04-15 PROCEDURE — 99213 OFFICE O/P EST LOW 20 MIN: CPT | Performed by: FAMILY MEDICINE

## 2024-04-15 NOTE — PROGRESS NOTES
"Chief Complaint  Annual Exam    Subjective        Arnaud Franklin presents to [facility]  History of Present Illness    Here for annual healthcare maintenance visit.  He quit smoking about 13 or so years ago after 20-pack-year history.  He drinks about 2 units of bourbon a day.  Not heavy.  He does not recall the last time he had 4 more drinks in 1 setting.  [place]  Reviewed his lipid panel.  The HDL is high.  No cardiovascular symptoms.  His blood pressure has been elevated the last 2 visits here.  He occasionally checks at home runs in the 130s.    Objective   Vital Signs:  /81   Pulse 74   Temp 97.8 °F (36.6 °C) (Temporal)   Ht 180.3 cm (70.98\")   Wt 77.9 kg (171 lb 11.2 oz)   SpO2 96%   BMI 23.96 kg/m²   Estimated body mass index is 23.96 kg/m² as calculated from the following:    Height as of this encounter: 180.3 cm (70.98\").    Weight as of this encounter: 77.9 kg (171 lb 11.2 oz).       BMI is within normal parameters. No other follow-up for BMI required.      Physical Exam  Vitals and nursing note reviewed.   Constitutional:       General: He is not in acute distress.  HENT:      Head: Normocephalic and atraumatic.      Right Ear: External ear normal.      Left Ear: External ear normal.   Eyes:      Pupils: Pupils are equal, round, and reactive to light.   Cardiovascular:      Rate and Rhythm: Normal rate and regular rhythm.   Pulmonary:      Effort: Pulmonary effort is normal.      Breath sounds: Normal breath sounds.   Abdominal:      General: Bowel sounds are normal. There is no distension.      Palpations: Abdomen is soft. There is no mass.      Tenderness: There is no abdominal tenderness. There is no guarding.      Hernia: No hernia is present.   Genitourinary:     Prostate: Normal. Not enlarged and not tender.   Musculoskeletal:         General: No tenderness. Normal range of motion.      Cervical back: Normal " range of motion and neck supple.   Skin:     General: Skin is warm and dry.   Neurological:      Mental Status: He is alert and oriented to person, place, and time.      Motor: No abnormal muscle tone.      Coordination: Coordination normal.   Psychiatric:         Mood and Affect: Mood normal.        Result Review :    The following data was reviewed by: Shahriar Coon MD on 04/15/2024:  Common labs          4/1/2024    08:21   Common Labs   Glucose 100    BUN 16    Creatinine 1.01    Sodium 141    Potassium 4.5    Chloride 104    Calcium 9.2    Total Protein 6.6    Albumin 4.3    Total Bilirubin 0.5    Alkaline Phosphatase 51    AST (SGOT) 19    ALT (SGPT) 16    WBC 4.59    Hemoglobin 15.2    Hematocrit 44.2    Platelets 222    Total Cholesterol 246    Triglycerides 62    HDL Cholesterol 86    LDL Cholesterol  150    PSA 2.060                     Assessment and Plan     Diagnoses and all orders for this visit:    1. Health care maintenance (Primary)  -     Comprehensive Metabolic Panel; Future  -     CBC & Differential; Future  -     Lipid Panel; Future  -     PSA Screen; Future    2. Gastroesophageal reflux disease without esophagitis    3. Smoking history  -      CT Chest Low Dose Cancer Screening WO; Future    4. Screening PSA (prostate specific antigen)  -     PSA Screen; Future    Annual healthcare maintenance visit.    Immunizations.  Up-to-date with the exception of the shingles and RSV.    Colon cancer screening up-to-date.    Prostate cancer screening up-to-date.    Elevated blood pressure readings.  I recommend he check his blood pressure at home.  Both verbal and written instructions given.  Send me numbers in a few weeks.    Smoking history.  Lung cancer screening.  Indicated.  I ordered a 1 year follow-up CT scan for this July.    GERD.  Continues long-term pantoprazole.  I recommend he cut it in half or take every other day.    Follow-up in 1 year.  Sooner as needed.         Follow Up     Return in  about 1 year (around 4/15/2025) for Annual physical, Lab Visit One Week Prior to Next Appointment.  Patient was given instructions and counseling regarding his condition or for health maintenance advice. Please see specific information pulled into the AVS if appropriate.

## 2024-07-15 ENCOUNTER — HOSPITAL ENCOUNTER (OUTPATIENT)
Dept: CT IMAGING | Facility: HOSPITAL | Age: 64
Discharge: HOME | End: 2024-07-15
Admitting: FAMILY MEDICINE
Payer: COMMERCIAL

## 2024-07-15 DIAGNOSIS — Z87.891 SMOKING HISTORY: ICD-10-CM

## 2024-07-15 PROCEDURE — 71271 CT THORAX LUNG CANCER SCR C-: CPT

## 2024-07-22 ENCOUNTER — OFFICE VISIT (OUTPATIENT)
Dept: FAMILY MEDICINE CLINIC | Facility: CLINIC | Age: 64
End: 2024-07-22
Payer: COMMERCIAL

## 2024-07-22 VITALS
BODY MASS INDEX: 24.22 KG/M2 | SYSTOLIC BLOOD PRESSURE: 150 MMHG | WEIGHT: 173 LBS | HEIGHT: 71 IN | DIASTOLIC BLOOD PRESSURE: 89 MMHG | TEMPERATURE: 97.3 F | OXYGEN SATURATION: 98 % | HEART RATE: 65 BPM

## 2024-07-22 DIAGNOSIS — N41.0 ACUTE PROSTATITIS: Primary | ICD-10-CM

## 2024-07-22 DIAGNOSIS — R35.0 FREQUENT URINATION: ICD-10-CM

## 2024-07-22 LAB
BILIRUB BLD-MCNC: NEGATIVE MG/DL
CLARITY, POC: CLEAR
COLOR UR: YELLOW
EXPIRATION DATE: ABNORMAL
GLUCOSE UR STRIP-MCNC: NEGATIVE MG/DL
KETONES UR QL: NEGATIVE
LEUKOCYTE EST, POC: NEGATIVE
Lab: ABNORMAL
NITRITE UR-MCNC: NEGATIVE MG/ML
PH UR: 5.5 [PH] (ref 5–8)
PROT UR STRIP-MCNC: NEGATIVE MG/DL
RBC # UR STRIP: ABNORMAL /UL
SP GR UR: 1.03 (ref 1–1.03)
UROBILINOGEN UR QL: ABNORMAL

## 2024-07-22 PROCEDURE — 81003 URINALYSIS AUTO W/O SCOPE: CPT | Performed by: FAMILY MEDICINE

## 2024-07-22 PROCEDURE — 99214 OFFICE O/P EST MOD 30 MIN: CPT | Performed by: FAMILY MEDICINE

## 2024-07-22 RX ORDER — DOXYCYCLINE HYCLATE 100 MG/1
100 CAPSULE ORAL 2 TIMES DAILY
Qty: 14 CAPSULE | Refills: 0 | Status: SHIPPED | OUTPATIENT
Start: 2024-07-22

## 2024-07-22 NOTE — PROGRESS NOTES
"Chief Complaint  Urinary Tract Infection (Started sat with frequent urination and pressure. Pt has some irritation on penis but no pain )    Subjective        Arnaud Franklin presents to [facility]  History of Present Illness    Urinary symptoms.  two days. Pubic and penis. No fever. No blood. No STI risk. No other concerning sx. history of kidney stones.  Possibly feels like that.  Also possibly some prostate discomfort.    Objective   Vital Signs:  /89   Pulse 65   Temp 97.3 °F (36.3 °C) (Temporal)   Ht 180.3 cm (70.98\")   Wt 78.5 kg (173 lb)   SpO2 98%   BMI 24.14 kg/m²   Estimated body mass index is 24.14 kg/m² as calculated from the following:    Height as of this encounter: 180.3 cm (70.98\").    Weight as of this encounter: 78.5 kg (173 lb).       BMI is within normal parameters. No other follow-up for BMI required.      Physical Exam  Constitutional:       Appearance: He is not ill-appearing.   Cardiovascular:      Rate and Rhythm: Normal rate.   Abdominal:      General: There is no distension.      Palpations: There is no mass.      Tenderness: There is no abdominal tenderness. There is no right CVA tenderness, left CVA tenderness, guarding or rebound.      Hernia: No hernia is present.      Comments: Point-of-care ultrasound.  Suprapubic.  No urinary retention.  No free fluid.  Examination of bilateral kidneys briefly reveals multiple large cysts that appear similar to previous CT scan last year.  Reviewed the CT images myself.  No definitive exophytic mass.  No definitive hydronephrosis.   Genitourinary:     Penis: Normal.       Testes: Normal.      Comments: Prostate mildly tender to palpation.  No palpable mass.    Urinalysis.  Moderate blood per otherwise negative.  Dipstick only.  Result Review :                     Assessment and Plan     Diagnoses and all orders for this visit:    1. Acute prostatitis (Primary)  -     Urine Culture - Urine, Urine, Clean Catch; " Future    2. Frequent urination  -     POC Urinalysis Dipstick, Automated  -     Urine Culture - Urine, Urine, Clean Catch; Future    Other orders  -     doxycycline (Vibramycin) 100 MG capsule; Take 1 capsule by mouth 2 (Two) Times a Day.  Dispense: 14 capsule; Refill: 0      Urinary discomfort.  Suggestive of mild acute prostatitis.  No gross hematuria but moderate blood on dipstick.  I am recommending doxycycline twice a day for 7 days.  If not improving he is going to let me know.  We will need to check a urinalysis in the future.  Of moderate blood still persisting.  Will need further evaluation then.       Follow Up     No follow-ups on file.  Patient was given instructions and counseling regarding his condition or for health maintenance advice. Please see specific information pulled into the AVS if appropriate.

## 2024-07-24 LAB
BACTERIA UR CULT: NORMAL
BACTERIA UR CULT: NORMAL

## 2024-07-29 ENCOUNTER — TELEPHONE (OUTPATIENT)
Dept: FAMILY MEDICINE CLINIC | Facility: CLINIC | Age: 64
End: 2024-07-29

## 2024-07-29 NOTE — TELEPHONE ENCOUNTER
Caller: Rigoberto Arnaud R    Relationship: Self    Best call back number: [phone]     Who are you requesting to speak with (clinical staff, provider,  specific staff member): DR SALAMANCA OR MA    What was the call regarding: PATIENT STATES THAT HE WENT TO THE ER AT [facility] OVER THE WEEKEND, AND WAS DIAGNOSED WITH KIDNEY STONES. HE STILL HAS RESIDUAL PAIN, BUT WAS PRESCRIBED MEDICATION TO TAKE FOR ISSUE. PATIENT WOULD LIKE TO DISCUSS FURTHER FOLLOW UP ABOUT MEDICATIONS, TREATMENT, AND HOW TO TELL WHEN HE PASSES THE STONE

## 2024-07-31 ENCOUNTER — OFFICE VISIT (OUTPATIENT)
Dept: FAMILY MEDICINE CLINIC | Facility: CLINIC | Age: 64
End: 2024-07-31
Payer: COMMERCIAL

## 2024-07-31 VITALS
DIASTOLIC BLOOD PRESSURE: 78 MMHG | OXYGEN SATURATION: 100 % | SYSTOLIC BLOOD PRESSURE: 130 MMHG | HEART RATE: 84 BPM | TEMPERATURE: 98.8 F | WEIGHT: 174.4 LBS | BODY MASS INDEX: 24.34 KG/M2

## 2024-07-31 DIAGNOSIS — N20.0 KIDNEY STONE ON LEFT SIDE: Primary | ICD-10-CM

## 2024-07-31 PROCEDURE — 99213 OFFICE O/P EST LOW 20 MIN: CPT | Performed by: FAMILY MEDICINE

## 2024-07-31 RX ORDER — TAMSULOSIN HYDROCHLORIDE 0.4 MG/1
0.4 CAPSULE ORAL DAILY
COMMUNITY
Start: 2024-07-27 | End: 2024-07-31

## 2024-07-31 RX ORDER — HYDROCODONE BITARTRATE AND ACETAMINOPHEN 5; 325 MG/1; MG/1
1 TABLET ORAL
COMMUNITY
Start: 2024-07-27 | End: 2024-07-31

## 2024-07-31 NOTE — PROGRESS NOTES
"Chief Complaint  Follow-up (For kidney stones. Does want to ask if he should continue Flomax or not. )    Subjective        Arnaud Franklin presents to [facility]  History of Present Illness    Just over a week ago I saw the patient for lower urinary symptoms and possible prostatitis.  Remote history of kidney stones.  He took the doxycycline.  [place]  He started having ongoing lower urinary tract symptoms.  But then developed left flank pain that reminded him of kidney stones many years ago.  He eventually got to a local emergency room.  I was able to review their records including the CT scan.  CT scan showed a 2 mm stone in the left distal ureter with evidence of hydronephrosis.  He was given Toradol some other pain medication sent home with Flomax.  He had some intermittent discomfort of the weekend and then he was pretty sure he passed a small stone.  He feels much better since then.  Just some twinges of pain on the left side.  No gross hematuria.  Had low-grade fever over the weekend but none since.  He is no longer taking the doxycycline.  The CT scan report also demonstrated a previous known renal pelvis cysts.    Objective   Vital Signs:  /78   Pulse 84   Temp 98.8 °F (37.1 °C) (Oral)   Wt 79.1 kg (174 lb 6.4 oz)   SpO2 100%   BMI 24.34 kg/m²   Estimated body mass index is 24.34 kg/m² as calculated from the following:    Height as of 7/22/24: 180.3 cm (70.98\").    Weight as of this encounter: 79.1 kg (174 lb 6.4 oz).       BMI is within normal parameters. No other follow-up for BMI required.      Physical Exam  Constitutional:       Appearance: Normal appearance.   Cardiovascular:      Rate and Rhythm: Normal rate.   Pulmonary:      Effort: Pulmonary effort is normal.   Abdominal:      General: There is no distension.      Palpations: Abdomen is soft. There is no mass.      Tenderness: There is no abdominal tenderness. There is no right CVA tenderness, " left CVA tenderness, guarding or rebound.      Hernia: No hernia is present.      Comments: Point-of-care ultrasound left kidney.  The cysts are once again demonstrated.  No definitive hydronephrosis.        Result Review :                     Assessment and Plan     Diagnoses and all orders for this visit:    1. Kidney stone on left side (Primary)  -     Ambulatory Referral to Urology    Kidney stone.  Left ureter.  Likely passed.  I did give him a referral for a urologist.  If his mild discomfort continues throughout the next couple weeks, I want him to follow-up with the urologist.  If he is feeling 100% better he can cancel if he wishes.  He is to maintain hydration.  I recommend over-the-counter ibuprofen for a few days.  He can stop the tamsulosin.  He will contact me with questions.         Follow Up     No follow-ups on file.  Patient was given instructions and counseling regarding his condition or for health maintenance advice. Please see specific information pulled into the AVS if appropriate.

## 2025-01-29 RX ORDER — PANTOPRAZOLE SODIUM 40 MG/1
40 TABLET, DELAYED RELEASE ORAL DAILY
Qty: 90 TABLET | Refills: 1 | Status: SHIPPED | OUTPATIENT
Start: 2025-01-29

## 2025-01-29 NOTE — TELEPHONE ENCOUNTER
Rx Refill Note  Requested Prescriptions     Pending Prescriptions Disp Refills    pantoprazole (PROTONIX) 40 MG EC tablet [Pharmacy Med Name: Pantoprazole Sodium 40 MG Oral Tablet Delayed Release] 90 tablet 1     Sig: TAKE 1 TABLET BY MOUTH DAILY      Last office visit with prescribing clinician: 7/31/2024   Last telemedicine visit with prescribing clinician: Visit date not found   Next office visit with prescribing clinician: 4/21/2025                         Would you like a call back once the refill request has been completed: [] Yes [] No    If the office needs to give you a call back, can they leave a voicemail: [] Yes [] No    Lakhwinder Coy  01/29/25, 08:13 EST

## 2025-03-31 ENCOUNTER — OFFICE VISIT (OUTPATIENT)
Dept: FAMILY MEDICINE CLINIC | Facility: CLINIC | Age: 65
End: 2025-03-31
Payer: COMMERCIAL

## 2025-03-31 VITALS
WEIGHT: 169.3 LBS | HEART RATE: 62 BPM | SYSTOLIC BLOOD PRESSURE: 132 MMHG | RESPIRATION RATE: 16 BRPM | DIASTOLIC BLOOD PRESSURE: 82 MMHG | OXYGEN SATURATION: 97 % | BODY MASS INDEX: 23.7 KG/M2 | HEIGHT: 71 IN

## 2025-03-31 DIAGNOSIS — K62.89 PROCTALGIA: Primary | ICD-10-CM

## 2025-03-31 PROCEDURE — 99213 OFFICE O/P EST LOW 20 MIN: CPT | Performed by: FAMILY MEDICINE

## 2025-03-31 RX ORDER — BETAMETHASONE VALERATE 1.2 MG/G
1 CREAM TOPICAL NIGHTLY
COMMUNITY
Start: 2025-01-21

## 2025-03-31 NOTE — PROGRESS NOTES
"Chief Complaint  Hemorrhoids    Subjective        Arnaud Franklin presents to [facility]  History of Present Illness    Patient complains of intermittent hemorrhoid pain.  Sometimes it will get cracked and tender and bleed a little bit.  Not bothering him much right now.  However over the last number of weeks she has had some shooting pain from perhaps the anus into the ischial tuberosities.  No scrotal or penile discomfort or numbness or changes.  No urinary changes.  Colonoscopy is up-to-date.  He is otherwise doing well.  Hurts worse when he sits for prolonged periods of time.  He states he is also had physical therapy in the past for tight gluteal muscles and tendons.    Objective   Vital Signs:  /82   Pulse 62   Resp 16   Ht 180.3 cm (70.98\")   Wt 76.8 kg (169 lb 4.8 oz)   SpO2 97%   BMI 23.63 kg/m²   Estimated body mass index is 23.63 kg/m² as calculated from the following:    Height as of this encounter: 180.3 cm (70.98\").    Weight as of this encounter: 76.8 kg (169 lb 4.8 oz).    BMI is within normal parameters. No other follow-up for BMI required.      Physical Exam  Constitutional:       Appearance: He is not ill-appearing.   Genitourinary:     Comments: The perianal exam was unremarkable.  There is some hemorrhoidal tissue at 9:00 it is not inflamed and not thrombosed.  The palpation of the ischial tuberosities is unremarkable.  There is no palpable nodule or mass in the perineal area.  The rectal exam reveals normal tone of the anus.  No palpable internal hemorrhoid or mass.  The prostate is enlarged, +3, nontender.  Normal consistency.  No nodules.       Result Review :                  Assessment and Plan   Diagnoses and all orders for this visit:    1. Proctalgia (Primary)    Proctalgia.  Intermittent hemorrhoids.  The radiating pain may or may not related to the proctalgia.  Could be musculoskeletal sacral pain.  At this time I recommend over-the-counter " Aleve 2 tablets once or twice a day.  And then if not improving to his surgeon for hemorrhoid evaluation.  With severe symptoms he will seek medical attention.         Follow Up   No follow-ups on file.  Patient was given instructions and counseling regarding his condition or for health maintenance advice. Please see specific information pulled into the AVS if appropriate.

## 2025-04-13 ENCOUNTER — PATIENT MESSAGE (OUTPATIENT)
Dept: FAMILY MEDICINE CLINIC | Facility: CLINIC | Age: 65
End: 2025-04-13
Payer: COMMERCIAL

## 2025-04-13 DIAGNOSIS — R36.1 HEMATOSPERMIA: Primary | ICD-10-CM

## 2025-04-21 ENCOUNTER — OFFICE VISIT (OUTPATIENT)
Dept: FAMILY MEDICINE CLINIC | Facility: CLINIC | Age: 65
End: 2025-04-21
Payer: COMMERCIAL

## 2025-04-21 VITALS
HEIGHT: 71 IN | HEART RATE: 63 BPM | BODY MASS INDEX: 23.4 KG/M2 | OXYGEN SATURATION: 96 % | SYSTOLIC BLOOD PRESSURE: 110 MMHG | DIASTOLIC BLOOD PRESSURE: 70 MMHG | RESPIRATION RATE: 17 BRPM | WEIGHT: 167.1 LBS

## 2025-04-21 DIAGNOSIS — Z87.891 SMOKING HISTORY: ICD-10-CM

## 2025-04-21 DIAGNOSIS — Z00.00 HEALTH CARE MAINTENANCE: Primary | ICD-10-CM

## 2025-04-21 DIAGNOSIS — K21.9 GASTROESOPHAGEAL REFLUX DISEASE WITHOUT ESOPHAGITIS: ICD-10-CM

## 2025-04-21 DIAGNOSIS — R36.1 HEMATOSPERMIA: ICD-10-CM

## 2025-04-21 PROCEDURE — 99214 OFFICE O/P EST MOD 30 MIN: CPT | Performed by: FAMILY MEDICINE

## 2025-04-21 PROCEDURE — 90677 PCV20 VACCINE IM: CPT | Performed by: FAMILY MEDICINE

## 2025-04-21 PROCEDURE — 90471 IMMUNIZATION ADMIN: CPT | Performed by: FAMILY MEDICINE

## 2025-04-21 PROCEDURE — 99396 PREV VISIT EST AGE 40-64: CPT | Performed by: FAMILY MEDICINE

## 2025-04-21 RX ORDER — AZELASTINE 1 MG/ML
2 SPRAY, METERED NASAL 2 TIMES DAILY
Qty: 30 ML | Refills: 12 | Status: SHIPPED | OUTPATIENT
Start: 2025-04-21

## 2025-04-21 RX ORDER — DOXYCYCLINE 100 MG/1
100 CAPSULE ORAL 2 TIMES DAILY
Qty: 20 CAPSULE | Refills: 0 | Status: SHIPPED | OUTPATIENT
Start: 2025-04-21

## 2025-04-21 NOTE — PROGRESS NOTES
"Chief Complaint  Annual Exam    Subjective        Arnaud Franklin presents to [facility]  History of Present Illness    Annual healthcare maintenance visit.  Quit smoking less than 15 years ago after 20-30-pack-year history.  He has 2 drinks of alcohol a day.  I saw him about 2 or 3 weeks ago with proctalgia, nonspecific.  Overall unremarkable prostate exam then.  He has had 2 episodes of hematospermia.  Although the proctalgia symptoms are somewhat improved.  No bleeding otherwise other than a little nosebleed.  His recent CBC was normal including platelet count.  His PSA is 3.  Trending upwards over the years.  He otherwise feels fine.  He maintains physical activity.  He continues pantoprazole for acid reflux.  Takes it every other day.  No dysphagia.    Objective   Vital Signs:  /70   Pulse 63   Resp 17   Ht 180.3 cm (70.98\")   Wt 75.8 kg (167 lb 1.6 oz)   SpO2 96%   BMI 23.32 kg/m²   Estimated body mass index is 23.32 kg/m² as calculated from the following:    Height as of this encounter: 180.3 cm (70.98\").    Weight as of this encounter: 75.8 kg (167 lb 1.6 oz).    BMI is within normal parameters. No other follow-up for BMI required.      Physical Exam  Constitutional:       Appearance: Normal appearance.   HENT:      Head: Atraumatic.      Mouth/Throat:      Pharynx: Oropharynx is clear. No oropharyngeal exudate or posterior oropharyngeal erythema.   Eyes:      Conjunctiva/sclera: Conjunctivae normal.   Neck:      Thyroid: No thyroid mass, thyromegaly or thyroid tenderness.   Cardiovascular:      Rate and Rhythm: Normal rate and regular rhythm.      Pulses: Normal pulses.      Heart sounds: Normal heart sounds.   Pulmonary:      Effort: Pulmonary effort is normal.      Breath sounds: Normal breath sounds.   Abdominal:      General: Abdomen is flat. There is no distension.      Palpations: Abdomen is soft. There is no mass.      Tenderness: There is no abdominal " tenderness.      Hernia: No hernia is present.   Musculoskeletal:         General: Normal range of motion.      Cervical back: Normal range of motion and neck supple. No muscular tenderness.   Lymphadenopathy:      Cervical: No cervical adenopathy.   Skin:     General: Skin is warm and dry.      Findings: No rash.   Neurological:      General: No focal deficit present.      Mental Status: He is alert and oriented to person, place, and time.   Psychiatric:         Mood and Affect: Mood normal.        Result Review :  The following data was reviewed by: Shahriar Coon MD on 04/21/2025:  Common labs          4/14/2025    08:00   Common Labs   Glucose 95    BUN 14    Creatinine 0.96    Sodium 141    Potassium 4.8    Chloride 103    Calcium 9.5    Albumin 4.2    Total Bilirubin 0.7    Alkaline Phosphatase 54    AST (SGOT) 25    ALT (SGPT) 18    WBC 5.65    Hemoglobin 15.6    Hematocrit 46.8    Platelets 228    Total Cholesterol 259    Triglycerides 38    HDL Cholesterol 109    LDL Cholesterol  144    PSA 3.100                  Assessment and Plan   Diagnoses and all orders for this visit:    1. Health care maintenance (Primary)    2. Gastroesophageal reflux disease without esophagitis    3. Smoking history  -     CT Chest Low Dose Wo; Future    4. Hematospermia    Other orders  -     azelastine (ASTELIN) 0.1 % nasal spray; Administer 2 sprays into the nostril(s) as directed by provider 2 (Two) Times a Day. Use in each nostril as directed  Dispense: 30 mL; Refill: 12  -     Pneumococcal Conjugate Vaccine 20-Valent All  -     doxycycline (Vibramycin) 100 MG capsule; Take 1 capsule by mouth 2 (Two) Times a Day.  Dispense: 20 capsule; Refill: 0    Annual healthcare maintenance visit.    Immunizations.  Prevnar 20 today.    Lung cancer screening.  Coming due.  I ordered it for this August.    Colon cancer screening up-to-date.    Hematospermia.  Proctalgia.  Suggestive of low-level prostatitis.  Prescribing doxycycline  twice a day for 20 days.  And referring to urology for evaluation.    GERD.  Continues pantoprazole.    See me 1 year.  Sooner as needed.    Allergies.  Continues Astelin nasal spray.  Refill given.         Follow Up   No follow-ups on file.  Patient was given instructions and counseling regarding his condition or for health maintenance advice. Please see specific information pulled into the AVS if appropriate.

## 2025-08-04 ENCOUNTER — HOSPITAL ENCOUNTER (OUTPATIENT)
Dept: CT IMAGING | Facility: HOSPITAL | Age: 65
Discharge: HOME | End: 2025-08-04
Admitting: FAMILY MEDICINE
Payer: COMMERCIAL

## 2025-08-04 DIAGNOSIS — Z87.891 SMOKING HISTORY: ICD-10-CM

## 2025-08-04 PROCEDURE — 71271 CT THORAX LUNG CANCER SCR C-: CPT

## 2025-08-05 RX ORDER — PANTOPRAZOLE SODIUM 40 MG/1
40 TABLET, DELAYED RELEASE ORAL DAILY
Qty: 90 TABLET | Refills: 1 | Status: SHIPPED | OUTPATIENT
Start: 2025-08-05

## 2025-08-11 ENCOUNTER — RESULTS FOLLOW-UP (OUTPATIENT)
Dept: FAMILY MEDICINE CLINIC | Facility: CLINIC | Age: 65
End: 2025-08-11
Payer: COMMERCIAL

## (undated) DEVICE — ADAPT CLN BIOGUARD AIR/H2O DISP

## (undated) DEVICE — CANN O2 ETCO2 FITS ALL CONN CO2 SMPL A/ 7IN DISP LF

## (undated) DEVICE — LN SMPL CO2 SHTRM SD STREAM W/M LUER

## (undated) DEVICE — KT ORCA ORCAPOD DISP STRL

## (undated) DEVICE — SNAR POLYP CAPTIVATOR RND STFF 2.4 240CM 10MM 1P/U

## (undated) DEVICE — TUBING, SUCTION, 1/4" X 10', STRAIGHT: Brand: MEDLINE

## (undated) DEVICE — SINGLE-USE BIOPSY FORCEPS: Brand: RADIAL JAW 4

## (undated) DEVICE — SENSR O2 OXIMAX FNGR A/ 18IN NONSTR

## (undated) DEVICE — THE SINGLE USE ETRAP – POLYP TRAP IS USED FOR SUCTION RETRIEVAL OF ENDOSCOPICALLY REMOVED POLYPS.: Brand: ETRAP